# Patient Record
Sex: MALE | Race: WHITE | NOT HISPANIC OR LATINO | Employment: FULL TIME | URBAN - METROPOLITAN AREA
[De-identification: names, ages, dates, MRNs, and addresses within clinical notes are randomized per-mention and may not be internally consistent; named-entity substitution may affect disease eponyms.]

---

## 2018-07-27 ENCOUNTER — HOSPITAL ENCOUNTER (OUTPATIENT)
Dept: PAIN MEDICINE | Facility: HOSPITAL | Age: 53
Discharge: HOME | End: 2018-07-27
Attending: ANESTHESIOLOGY | Admitting: ANESTHESIOLOGY

## 2018-07-27 LAB
AMPHETAMINES UR QL SCN: NEGATIVE
BARBITURATES UR QL SCN: NEGATIVE
BENZODIAZ UR QL SCN: ABNORMAL
BZE UR QL SCN: NEGATIVE
CREAT 24H UR-MCNC: ABNORMAL MG/DL
METHADONE UR QL SCN: NEGATIVE
OPIATE CONFIRMATION URINE: ABNORMAL
OPIATES TESTED UR SCN: NEGATIVE
PCP UR QL: NEGATIVE
THC SERPLBLD CFM-MCNC: NEGATIVE NG/ML

## 2018-09-07 ENCOUNTER — HOSPITAL ENCOUNTER (OUTPATIENT)
Dept: PAIN MEDICINE | Facility: HOSPITAL | Age: 53
Discharge: HOME | End: 2018-09-07
Attending: ANESTHESIOLOGY | Admitting: ANESTHESIOLOGY

## 2018-11-16 ENCOUNTER — HOSPITAL ENCOUNTER (OUTPATIENT)
Dept: PAIN MEDICINE | Facility: HOSPITAL | Age: 53
Discharge: HOME | End: 2018-11-16
Attending: ANESTHESIOLOGY | Admitting: ANESTHESIOLOGY

## 2019-01-11 ENCOUNTER — HOSPITAL ENCOUNTER (OUTPATIENT)
Dept: PAIN MEDICINE | Facility: HOSPITAL | Age: 54
Discharge: HOME | End: 2019-01-11
Attending: ANESTHESIOLOGY | Admitting: ANESTHESIOLOGY

## 2019-01-11 LAB
AMPHETAMINES UR QL SCN: NEGATIVE
BARBITURATES UR QL SCN: NEGATIVE
BENZODIAZ UR QL SCN: ABNORMAL
BENZODIAZ UR QL SCN: ABNORMAL
BZE UR QL SCN: NEGATIVE
CREAT 24H UR-MCNC: ABNORMAL MG/DL
METHADONE UR QL SCN: NEGATIVE
OPIATE CONFIRMATION URINE: ABNORMAL
OPIATES TESTED UR SCN: POSITIVE
PCP UR QL: NEGATIVE
THC SERPLBLD CFM-MCNC: NEGATIVE NG/ML

## 2019-03-08 ENCOUNTER — HOSPITAL ENCOUNTER (OUTPATIENT)
Dept: PAIN MEDICINE | Facility: HOSPITAL | Age: 54
Discharge: HOME | End: 2019-03-08
Attending: ANESTHESIOLOGY | Admitting: ANESTHESIOLOGY

## 2019-09-09 ENCOUNTER — OFFICE VISIT (OUTPATIENT)
Dept: PAIN MEDICINE | Facility: CLINIC | Age: 54
End: 2019-09-09

## 2019-09-09 VITALS
HEIGHT: 70 IN | SYSTOLIC BLOOD PRESSURE: 133 MMHG | OXYGEN SATURATION: 96 % | DIASTOLIC BLOOD PRESSURE: 90 MMHG | BODY MASS INDEX: 31.5 KG/M2 | TEMPERATURE: 98.5 F | RESPIRATION RATE: 16 BRPM | WEIGHT: 220 LBS | HEART RATE: 88 BPM

## 2019-09-09 DIAGNOSIS — G89.29 CHRONIC MIDLINE LOW BACK PAIN WITH LEFT-SIDED SCIATICA: ICD-10-CM

## 2019-09-09 DIAGNOSIS — M54.2 NECK PAIN, CHRONIC: ICD-10-CM

## 2019-09-09 DIAGNOSIS — M54.42 CHRONIC MIDLINE LOW BACK PAIN WITH LEFT-SIDED SCIATICA: ICD-10-CM

## 2019-09-09 DIAGNOSIS — Z79.899 OTHER LONG TERM (CURRENT) DRUG THERAPY: ICD-10-CM

## 2019-09-09 DIAGNOSIS — M54.16 LUMBAR RADICULOPATHY: Primary | ICD-10-CM

## 2019-09-09 DIAGNOSIS — G89.29 NECK PAIN, CHRONIC: ICD-10-CM

## 2019-09-09 DIAGNOSIS — M54.12 CERVICAL RADICULOPATHY: ICD-10-CM

## 2019-09-09 PROCEDURE — 99213 OFFICE O/P EST LOW 20 MIN: CPT | Performed by: PHYSICAL MEDICINE & REHABILITATION

## 2019-09-09 PROCEDURE — G0463 HOSPITAL OUTPT CLINIC VISIT: HCPCS | Performed by: PHYSICAL MEDICINE & REHABILITATION

## 2019-09-09 RX ORDER — HYDROCODONE BITARTRATE AND ACETAMINOPHEN 10; 325 MG/1; MG/1
1 TABLET ORAL EVERY 8 HOURS PRN
Qty: 90 TABLET | Refills: 0 | Status: SHIPPED | OUTPATIENT
Start: 2019-09-09 | End: 2019-12-06 | Stop reason: SDUPTHER

## 2019-09-09 RX ORDER — HYDROCODONE BITARTRATE AND ACETAMINOPHEN 10; 325 MG/1; MG/1
1 TABLET ORAL EVERY 8 HOURS PRN
Qty: 90 TABLET | Refills: 0 | Status: SHIPPED | OUTPATIENT
Start: 2019-09-09 | End: 2019-09-09 | Stop reason: SDUPTHER

## 2019-09-09 RX ORDER — FENOFIBRATE 145 MG/1
TABLET, COATED ORAL
COMMUNITY
Start: 2019-08-16

## 2019-09-09 RX ORDER — HYDRALAZINE HYDROCHLORIDE 50 MG/1
TABLET, FILM COATED ORAL
COMMUNITY
Start: 2019-08-16 | End: 2020-08-28 | Stop reason: SDUPTHER

## 2019-09-09 RX ORDER — CYCLOBENZAPRINE HCL 10 MG
TABLET ORAL
COMMUNITY
Start: 2019-08-16

## 2019-09-09 RX ORDER — SIMVASTATIN 40 MG
TABLET ORAL
COMMUNITY
Start: 2019-08-16

## 2019-09-09 RX ORDER — TERBINAFINE HYDROCHLORIDE 250 MG/1
TABLET ORAL EVERY 24 HOURS
COMMUNITY
Start: 2018-07-27

## 2019-09-09 RX ORDER — BUTALBITAL, ACETAMINOPHEN AND CAFFEINE 50; 325; 40 MG/1; MG/1; MG/1
TABLET ORAL
COMMUNITY
Start: 2019-07-05

## 2019-09-09 RX ORDER — TEMAZEPAM 30 MG/1
CAPSULE ORAL
COMMUNITY
Start: 2019-07-17 | End: 2020-06-10

## 2019-09-09 RX ORDER — DOXAZOSIN 2 MG/1
TABLET ORAL EVERY 24 HOURS
COMMUNITY
Start: 2018-07-27 | End: 2020-08-28 | Stop reason: SDUPTHER

## 2019-09-09 NOTE — PROGRESS NOTES
Subjective   Jose Balderas is a 53 y.o. male.     Low back pain,  Lumbar Back Pain with LLE Numbness and LLE weakness.  Lumbar Back Pain is increased with prolonged ambulation.  Patient declines Epidural therapy.  Patient agreds to try ZT Lido pain patches for Pain, denied by insurance. Taking Norco 10mg TID prn with Dr. Ferguson with good relief without side effects. CT Lumbar with L5-S1 ant/post fusion, severe DJD at L4-L5, mild-mod L4-5 stenosis. MRI cervical with multilevel DDD and DJD worst at C6/7 with mild stenosis.         The following portions of the patient's history were reviewed and updated as appropriate: allergies, current medications, past family history, past medical history, past social history, past surgical history and problem list.    Review of Systems   Constitutional: Negative for chills, fatigue and fever.   HENT: Negative for hearing loss and trouble swallowing.    Eyes: Negative for visual disturbance.   Respiratory: Negative for shortness of breath.    Cardiovascular: Negative for chest pain.   Gastrointestinal: Negative for abdominal pain, constipation, diarrhea, nausea and vomiting.   Genitourinary: Negative for urinary incontinence.   Musculoskeletal: Positive for back pain and neck pain. Negative for arthralgias, joint swelling and myalgias.   Neurological: Positive for numbness. Negative for dizziness, weakness and headache.       Objective   Physical Exam   Constitutional: He is oriented to person, place, and time. He appears well-developed and well-nourished.   HENT:   Head: Normocephalic and atraumatic.   Eyes: EOM are normal. Pupils are equal, round, and reactive to light.   Cardiovascular: Normal rate, regular rhythm, normal heart sounds and intact distal pulses.   Pulmonary/Chest: Breath sounds normal.   Abdominal: Soft. Bowel sounds are normal. He exhibits no distension. There is no tenderness.   Neurological: He is alert and oriented to person, place, and time. He has normal  strength and normal reflexes. He displays normal reflexes. No sensory deficit.   Psychiatric: He has a normal mood and affect. His behavior is normal. Thought content normal.         Assessment/Plan   Jose was seen today for back pain, leg pain, foot pain and neck pain.    Diagnoses and all orders for this visit:    Lumbar radiculopathy    Neck pain, chronic    Cervical radiculopathy    Other long term (current) drug therapy    Chronic midline low back pain with left-sided sciatica    Other orders  -     Discontinue: HYDROcodone-acetaminophen (NORCO)  MG per tablet; Take 1 tablet by mouth Every 8 (Eight) Hours As Needed for Moderate Pain .  -     Discontinue: HYDROcodone-acetaminophen (NORCO)  MG per tablet; Take 1 tablet by mouth Every 8 (Eight) Hours As Needed for Moderate Pain .  -     HYDROcodone-acetaminophen (NORCO)  MG per tablet; Take 1 tablet by mouth Every 8 (Eight) Hours As Needed for Moderate Pain .        Inspect reviewed, in order. UDS in order 1/11/19.  Cont Norco 10mg TID prn.  RTC 3 months for f/u. Stable.

## 2019-12-06 ENCOUNTER — OFFICE VISIT (OUTPATIENT)
Dept: PAIN MEDICINE | Facility: CLINIC | Age: 54
End: 2019-12-06

## 2019-12-06 VITALS
OXYGEN SATURATION: 94 % | SYSTOLIC BLOOD PRESSURE: 131 MMHG | TEMPERATURE: 98.9 F | DIASTOLIC BLOOD PRESSURE: 79 MMHG | HEART RATE: 83 BPM | WEIGHT: 225 LBS | BODY MASS INDEX: 32.21 KG/M2 | HEIGHT: 70 IN | RESPIRATION RATE: 16 BRPM

## 2019-12-06 DIAGNOSIS — G89.29 CHRONIC MIDLINE LOW BACK PAIN WITH LEFT-SIDED SCIATICA: ICD-10-CM

## 2019-12-06 DIAGNOSIS — G89.29 NECK PAIN, CHRONIC: ICD-10-CM

## 2019-12-06 DIAGNOSIS — Z79.899 OTHER LONG TERM (CURRENT) DRUG THERAPY: ICD-10-CM

## 2019-12-06 DIAGNOSIS — M54.42 CHRONIC MIDLINE LOW BACK PAIN WITH LEFT-SIDED SCIATICA: ICD-10-CM

## 2019-12-06 DIAGNOSIS — M54.2 NECK PAIN, CHRONIC: ICD-10-CM

## 2019-12-06 DIAGNOSIS — M54.12 CERVICAL RADICULOPATHY: Primary | ICD-10-CM

## 2019-12-06 DIAGNOSIS — M54.16 LUMBAR RADICULOPATHY: ICD-10-CM

## 2019-12-06 PROCEDURE — G0463 HOSPITAL OUTPT CLINIC VISIT: HCPCS | Performed by: PHYSICAL MEDICINE & REHABILITATION

## 2019-12-06 PROCEDURE — 99213 OFFICE O/P EST LOW 20 MIN: CPT | Performed by: PHYSICAL MEDICINE & REHABILITATION

## 2019-12-06 RX ORDER — HYDROCODONE BITARTRATE AND ACETAMINOPHEN 10; 325 MG/1; MG/1
1 TABLET ORAL EVERY 8 HOURS PRN
Qty: 90 TABLET | Refills: 0 | Status: SHIPPED | OUTPATIENT
Start: 2019-12-06 | End: 2020-02-27 | Stop reason: SDUPTHER

## 2019-12-06 RX ORDER — HYDROCODONE BITARTRATE AND ACETAMINOPHEN 10; 325 MG/1; MG/1
1 TABLET ORAL EVERY 8 HOURS PRN
Qty: 90 TABLET | Refills: 0 | Status: SHIPPED | OUTPATIENT
Start: 2019-12-06 | End: 2019-12-06 | Stop reason: SDUPTHER

## 2019-12-06 NOTE — PROGRESS NOTES
Subjective   Jose Balderas is a 54 y.o. male.     Low back pain,  Lumbar Back Pain with LLE Numbness and LLE weakness.  Lumbar Back Pain is increased with prolonged ambulation.  Patient declines Epidural therapy.  Patient agreds to try ZT Lido pain patches for Pain, denied by insurance. Taking Norco 10mg TID prn with Dr. Ferguson with good relief without side effects. CT Lumbar with L5-S1 ant/post fusion, severe DJD at L4-L5, mild-mod L4-5 stenosis. MRI cervical with multilevel DDD and DJD worst at C6/7 with mild stenosis. Had fungal sinus infection, resolved with antifungals.         The following portions of the patient's history were reviewed and updated as appropriate: allergies, current medications, past family history, past medical history, past social history, past surgical history and problem list.    Review of Systems   Constitutional: Negative for chills, fatigue and fever.   HENT: Negative for hearing loss and trouble swallowing.    Eyes: Negative for visual disturbance.   Respiratory: Negative for shortness of breath.    Cardiovascular: Negative for chest pain.   Gastrointestinal: Negative for abdominal pain, constipation, diarrhea, nausea and vomiting.   Genitourinary: Negative for urinary incontinence.   Musculoskeletal: Positive for back pain and neck pain. Negative for arthralgias, joint swelling and myalgias.   Neurological: Positive for numbness. Negative for dizziness, weakness and headache.       Objective   Physical Exam   Constitutional: He is oriented to person, place, and time. He appears well-developed and well-nourished.   HENT:   Head: Normocephalic and atraumatic.   Eyes: EOM are normal. Pupils are equal, round, and reactive to light.   Cardiovascular: Normal rate, regular rhythm, normal heart sounds and intact distal pulses.   Pulmonary/Chest: Breath sounds normal.   Abdominal: Soft. Bowel sounds are normal. He exhibits no distension. There is no tenderness.   Neurological: He is alert and  oriented to person, place, and time. He has normal strength and normal reflexes. He displays normal reflexes. No sensory deficit.   Psychiatric: He has a normal mood and affect. His behavior is normal. Thought content normal.         Assessment/Plan   Jose was seen today for back pain.    Diagnoses and all orders for this visit:    Cervical radiculopathy    Chronic midline low back pain with left-sided sciatica    Lumbar radiculopathy    Neck pain, chronic    Other long term (current) drug therapy        Inspect reviewed, in order. UDS in order 1/11/19.  Cont Norco 10mg TID prn.  RTC 3 months for f/u. Stable.

## 2020-02-27 ENCOUNTER — RESULTS ENCOUNTER (OUTPATIENT)
Dept: PAIN MEDICINE | Facility: CLINIC | Age: 55
End: 2020-02-27

## 2020-02-27 ENCOUNTER — OFFICE VISIT (OUTPATIENT)
Dept: PAIN MEDICINE | Facility: CLINIC | Age: 55
End: 2020-02-27

## 2020-02-27 VITALS
BODY MASS INDEX: 32.21 KG/M2 | OXYGEN SATURATION: 93 % | RESPIRATION RATE: 16 BRPM | HEIGHT: 70 IN | DIASTOLIC BLOOD PRESSURE: 79 MMHG | SYSTOLIC BLOOD PRESSURE: 126 MMHG | HEART RATE: 85 BPM | TEMPERATURE: 98 F | WEIGHT: 225 LBS

## 2020-02-27 DIAGNOSIS — M54.2 NECK PAIN, CHRONIC: ICD-10-CM

## 2020-02-27 DIAGNOSIS — F19.90 CURRENT DRUG USE: Primary | ICD-10-CM

## 2020-02-27 DIAGNOSIS — G89.29 NECK PAIN, CHRONIC: ICD-10-CM

## 2020-02-27 DIAGNOSIS — M54.12 CERVICAL RADICULOPATHY: ICD-10-CM

## 2020-02-27 DIAGNOSIS — M54.42 CHRONIC MIDLINE LOW BACK PAIN WITH LEFT-SIDED SCIATICA: ICD-10-CM

## 2020-02-27 DIAGNOSIS — M54.16 LUMBAR RADICULOPATHY: ICD-10-CM

## 2020-02-27 DIAGNOSIS — G89.29 CHRONIC MIDLINE LOW BACK PAIN WITH LEFT-SIDED SCIATICA: ICD-10-CM

## 2020-02-27 DIAGNOSIS — F19.90 CURRENT DRUG USE: ICD-10-CM

## 2020-02-27 DIAGNOSIS — Z79.899 OTHER LONG TERM (CURRENT) DRUG THERAPY: ICD-10-CM

## 2020-02-27 PROCEDURE — 99213 OFFICE O/P EST LOW 20 MIN: CPT | Performed by: PHYSICAL MEDICINE & REHABILITATION

## 2020-02-27 PROCEDURE — G0463 HOSPITAL OUTPT CLINIC VISIT: HCPCS | Performed by: PHYSICAL MEDICINE & REHABILITATION

## 2020-02-27 RX ORDER — HYDROCODONE BITARTRATE AND ACETAMINOPHEN 10; 325 MG/1; MG/1
1 TABLET ORAL EVERY 8 HOURS PRN
Qty: 90 TABLET | Refills: 0 | Status: SHIPPED | OUTPATIENT
Start: 2020-02-27 | End: 2020-02-27 | Stop reason: SDUPTHER

## 2020-02-27 RX ORDER — ICOSAPENT ETHYL 1000 MG/1
CAPSULE ORAL
COMMUNITY
Start: 2020-02-06

## 2020-02-27 RX ORDER — ATORVASTATIN CALCIUM 40 MG/1
TABLET, FILM COATED ORAL
COMMUNITY
Start: 2020-02-06

## 2020-02-27 RX ORDER — HYDROCODONE BITARTRATE AND ACETAMINOPHEN 10; 325 MG/1; MG/1
1 TABLET ORAL EVERY 8 HOURS PRN
Qty: 90 TABLET | Refills: 0 | Status: SHIPPED | OUTPATIENT
Start: 2020-02-27 | End: 2020-06-05 | Stop reason: SDUPTHER

## 2020-02-27 RX ORDER — VARENICLINE TARTRATE 1 MG/1
TABLET, FILM COATED ORAL
COMMUNITY
Start: 2020-02-06

## 2020-02-27 RX ORDER — FOLIC ACID 1 MG/1
TABLET ORAL
COMMUNITY
Start: 2020-02-06

## 2020-02-27 NOTE — PROGRESS NOTES
Subjective   Jose Balderas is a 54 y.o. male.     Low back pain,  Lumbar Back Pain with LLE Numbness and LLE weakness.  Lumbar Back Pain is increased with prolonged ambulation.  Patient declines Epidural therapy.  Patient agreds to try ZT Lido pain patches for Pain, denied by insurance. Taking Norco 10mg TID prn with Dr. Ferguson with good relief without side effects. CT Lumbar with L5-S1 ant/post fusion, severe DJD at L4-L5, mild-mod L4-5 stenosis. MRI cervical with multilevel DDD and DJD worst at C6/7 with mild stenosis. Had fungal sinus infection, resolved with antifungals.       The following portions of the patient's history were reviewed and updated as appropriate: allergies, current medications, past family history, past medical history, past social history, past surgical history and problem list.    Review of Systems   Constitutional: Negative for chills, fatigue and fever.   HENT: Negative for hearing loss and trouble swallowing.    Eyes: Negative for visual disturbance.   Respiratory: Negative for shortness of breath.    Cardiovascular: Negative for chest pain.   Gastrointestinal: Negative for abdominal pain, constipation, diarrhea, nausea and vomiting.   Genitourinary: Negative for urinary incontinence.   Musculoskeletal: Positive for back pain and neck pain. Negative for arthralgias, joint swelling and myalgias.   Neurological: Positive for numbness. Negative for dizziness, weakness and headache.       Objective   Physical Exam   Constitutional: He is oriented to person, place, and time. He appears well-developed and well-nourished.   HENT:   Head: Normocephalic and atraumatic.   Eyes: Pupils are equal, round, and reactive to light. EOM are normal.   Cardiovascular: Normal rate, regular rhythm, normal heart sounds and intact distal pulses.   Pulmonary/Chest: Breath sounds normal.   Abdominal: Soft. Bowel sounds are normal. He exhibits no distension. There is no tenderness.   Neurological: He is alert and  oriented to person, place, and time. He has normal strength and normal reflexes. He displays normal reflexes. No sensory deficit.   Psychiatric: He has a normal mood and affect. His behavior is normal. Thought content normal.         Assessment/Plan   Jose was seen today for back pain.    Diagnoses and all orders for this visit:    Cervical radiculopathy    Chronic midline low back pain with left-sided sciatica    Lumbar radiculopathy    Neck pain, chronic    Other long term (current) drug therapy        Inspect reviewed, in order. UDS in order 1/11/19.  Cont Norco 10mg TID prn.  RTC 3 months for f/u. Stable.

## 2020-05-08 RX ORDER — TEMAZEPAM 30 MG/1
CAPSULE ORAL
Qty: 30 CAPSULE | Refills: 0 | OUTPATIENT
Start: 2020-05-08

## 2020-05-11 RX ORDER — TEMAZEPAM 30 MG/1
CAPSULE ORAL
Qty: 30 CAPSULE | Refills: 0 | OUTPATIENT
Start: 2020-05-11

## 2020-06-05 ENCOUNTER — OFFICE VISIT (OUTPATIENT)
Dept: PAIN MEDICINE | Facility: CLINIC | Age: 55
End: 2020-06-05

## 2020-06-05 VITALS
BODY MASS INDEX: 30.78 KG/M2 | OXYGEN SATURATION: 96 % | RESPIRATION RATE: 16 BRPM | HEART RATE: 89 BPM | HEIGHT: 70 IN | WEIGHT: 215 LBS | TEMPERATURE: 99.5 F | DIASTOLIC BLOOD PRESSURE: 88 MMHG | SYSTOLIC BLOOD PRESSURE: 144 MMHG

## 2020-06-05 DIAGNOSIS — M54.42 CHRONIC MIDLINE LOW BACK PAIN WITH LEFT-SIDED SCIATICA: ICD-10-CM

## 2020-06-05 DIAGNOSIS — M54.12 CERVICAL RADICULOPATHY: Primary | ICD-10-CM

## 2020-06-05 DIAGNOSIS — G89.29 CHRONIC MIDLINE LOW BACK PAIN WITH LEFT-SIDED SCIATICA: ICD-10-CM

## 2020-06-05 DIAGNOSIS — Z79.899 OTHER LONG TERM (CURRENT) DRUG THERAPY: ICD-10-CM

## 2020-06-05 DIAGNOSIS — M54.16 LUMBAR RADICULOPATHY: ICD-10-CM

## 2020-06-05 DIAGNOSIS — M54.2 NECK PAIN, CHRONIC: ICD-10-CM

## 2020-06-05 DIAGNOSIS — G89.29 NECK PAIN, CHRONIC: ICD-10-CM

## 2020-06-05 PROCEDURE — G0463 HOSPITAL OUTPT CLINIC VISIT: HCPCS | Performed by: PHYSICAL MEDICINE & REHABILITATION

## 2020-06-05 PROCEDURE — 99213 OFFICE O/P EST LOW 20 MIN: CPT | Performed by: PHYSICAL MEDICINE & REHABILITATION

## 2020-06-05 RX ORDER — HYDROCODONE BITARTRATE AND ACETAMINOPHEN 10; 325 MG/1; MG/1
1 TABLET ORAL EVERY 8 HOURS PRN
Qty: 90 TABLET | Refills: 0 | Status: SHIPPED | OUTPATIENT
Start: 2020-06-05 | End: 2020-08-28 | Stop reason: SDUPTHER

## 2020-06-05 RX ORDER — HYDROCODONE BITARTRATE AND ACETAMINOPHEN 10; 325 MG/1; MG/1
1 TABLET ORAL EVERY 8 HOURS PRN
Qty: 90 TABLET | Refills: 0 | Status: SHIPPED | OUTPATIENT
Start: 2020-06-05 | End: 2020-06-05 | Stop reason: SDUPTHER

## 2020-06-10 RX ORDER — TEMAZEPAM 30 MG/1
CAPSULE ORAL
Qty: 30 CAPSULE | Refills: 0 | Status: SHIPPED | OUTPATIENT
Start: 2020-06-10 | End: 2020-07-10

## 2020-08-28 ENCOUNTER — OFFICE VISIT (OUTPATIENT)
Dept: PAIN MEDICINE | Facility: CLINIC | Age: 55
End: 2020-08-28

## 2020-08-28 VITALS
HEIGHT: 70 IN | OXYGEN SATURATION: 95 % | SYSTOLIC BLOOD PRESSURE: 151 MMHG | RESPIRATION RATE: 16 BRPM | BODY MASS INDEX: 31.5 KG/M2 | WEIGHT: 220 LBS | TEMPERATURE: 98.5 F | HEART RATE: 83 BPM | DIASTOLIC BLOOD PRESSURE: 99 MMHG

## 2020-08-28 DIAGNOSIS — Z79.899 OTHER LONG TERM (CURRENT) DRUG THERAPY: ICD-10-CM

## 2020-08-28 DIAGNOSIS — M54.42 CHRONIC MIDLINE LOW BACK PAIN WITH LEFT-SIDED SCIATICA: Primary | ICD-10-CM

## 2020-08-28 DIAGNOSIS — M54.12 CERVICAL RADICULOPATHY: ICD-10-CM

## 2020-08-28 DIAGNOSIS — G89.29 NECK PAIN, CHRONIC: ICD-10-CM

## 2020-08-28 DIAGNOSIS — G89.29 CHRONIC MIDLINE LOW BACK PAIN WITH LEFT-SIDED SCIATICA: Primary | ICD-10-CM

## 2020-08-28 DIAGNOSIS — M54.2 NECK PAIN, CHRONIC: ICD-10-CM

## 2020-08-28 DIAGNOSIS — M54.16 LUMBAR RADICULOPATHY: ICD-10-CM

## 2020-08-28 PROCEDURE — G0463 HOSPITAL OUTPT CLINIC VISIT: HCPCS | Performed by: PHYSICAL MEDICINE & REHABILITATION

## 2020-08-28 PROCEDURE — 99213 OFFICE O/P EST LOW 20 MIN: CPT | Performed by: PHYSICAL MEDICINE & REHABILITATION

## 2020-08-28 RX ORDER — HYDROCODONE BITARTRATE AND ACETAMINOPHEN 10; 325 MG/1; MG/1
1 TABLET ORAL EVERY 8 HOURS PRN
Qty: 90 TABLET | Refills: 0 | Status: SHIPPED | OUTPATIENT
Start: 2020-08-28 | End: 2020-08-28 | Stop reason: SDUPTHER

## 2020-08-28 RX ORDER — TEMAZEPAM 30 MG/1
CAPSULE ORAL
COMMUNITY
Start: 2020-08-07

## 2020-08-28 RX ORDER — DOXAZOSIN MESYLATE 4 MG/1
4 TABLET ORAL NIGHTLY
COMMUNITY

## 2020-08-28 RX ORDER — HYDRALAZINE HYDROCHLORIDE 100 MG/1
TABLET, FILM COATED ORAL
COMMUNITY
Start: 2020-08-07 | End: 2020-08-28 | Stop reason: SDUPTHER

## 2020-08-28 RX ORDER — MONTELUKAST SODIUM 10 MG/1
TABLET ORAL
COMMUNITY
Start: 2020-08-07

## 2020-08-28 RX ORDER — DOXAZOSIN MESYLATE 4 MG/1
TABLET ORAL
COMMUNITY
Start: 2020-08-07 | End: 2020-08-28 | Stop reason: SDUPTHER

## 2020-08-28 RX ORDER — HYDROCODONE BITARTRATE AND ACETAMINOPHEN 10; 325 MG/1; MG/1
1 TABLET ORAL EVERY 8 HOURS PRN
Qty: 90 TABLET | Refills: 0 | Status: SHIPPED | OUTPATIENT
Start: 2020-08-28 | End: 2020-12-04 | Stop reason: SDUPTHER

## 2020-08-28 NOTE — PROGRESS NOTES
Subjective   Jose Balderas is a 54 y.o. male.     Low back pain,  Lumbar Back Pain with LLE Numbness and LLE weakness.  Lumbar Back Pain is increased with prolonged ambulation.  Patient declines Epidural therapy.  Patient agreds to try ZT Lido pain patches for Pain, denied by insurance. Taking Norco 10mg TID prn with Dr. Ferguson with good relief without side effects. CT Lumbar with L5-S1 ant/post fusion, severe DJD at L4-L5, mild-mod L4-5 stenosis. MRI cervical with multilevel DDD and DJD worst at C6/7 with mild stenosis. Had fungal sinus infection, resolved with antifungals. Busy at work, current meds adequate.       The following portions of the patient's history were reviewed and updated as appropriate: allergies, current medications, past family history, past medical history, past social history, past surgical history and problem list.    Review of Systems   Constitutional: Negative for chills, fatigue and fever.   HENT: Negative for hearing loss and trouble swallowing.    Eyes: Negative for visual disturbance.   Respiratory: Negative for shortness of breath.    Cardiovascular: Negative for chest pain.   Gastrointestinal: Negative for abdominal pain, constipation, diarrhea, nausea and vomiting.   Genitourinary: Negative for urinary incontinence.   Musculoskeletal: Positive for back pain and neck pain. Negative for arthralgias, joint swelling and myalgias.   Neurological: Positive for numbness. Negative for dizziness, weakness and headache.       Objective   Physical Exam   Constitutional: He is oriented to person, place, and time. He appears well-developed and well-nourished.   HENT:   Head: Normocephalic and atraumatic.   Eyes: Pupils are equal, round, and reactive to light. EOM are normal.   Cardiovascular: Normal rate, regular rhythm, normal heart sounds and intact distal pulses.   Pulmonary/Chest: Breath sounds normal.   Abdominal: Soft. Bowel sounds are normal. He exhibits no distension. There is no  tenderness.   Neurological: He is alert and oriented to person, place, and time. He has normal strength and normal reflexes. He displays normal reflexes. No sensory deficit.   Psychiatric: He has a normal mood and affect. His behavior is normal. Thought content normal.         Assessment/Plan   Jose was seen today for back pain.    Diagnoses and all orders for this visit:    Chronic midline low back pain with left-sided sciatica    Lumbar radiculopathy    Neck pain, chronic    Cervical radiculopathy    Other long term (current) drug therapy        Inspect reviewed, in order. UDS in order 2/27/20.  Cont Norco 10mg TID prn. Denies side effects, allows him to be functional.  RTC 3 months for f/u. Stable.

## 2020-10-30 ENCOUNTER — ON CAMPUS - OUTPATIENT (OUTPATIENT)
Dept: URBAN - METROPOLITAN AREA HOSPITAL 77 | Facility: HOSPITAL | Age: 55
End: 2020-10-30
Payer: COMMERCIAL

## 2020-10-30 DIAGNOSIS — R11.2 NAUSEA WITH VOMITING, UNSPECIFIED: ICD-10-CM

## 2020-10-30 DIAGNOSIS — K20.80 OTHER ESOPHAGITIS WITHOUT BLEEDING: ICD-10-CM

## 2020-10-30 DIAGNOSIS — Z12.11 ENCOUNTER FOR SCREENING FOR MALIGNANT NEOPLASM OF COLON: ICD-10-CM

## 2020-10-30 PROCEDURE — 45378 DIAGNOSTIC COLONOSCOPY: CPT | Mod: 33 | Performed by: INTERNAL MEDICINE

## 2020-10-30 PROCEDURE — 43235 EGD DIAGNOSTIC BRUSH WASH: CPT | Mod: 59 | Performed by: INTERNAL MEDICINE

## 2020-12-04 ENCOUNTER — OFFICE VISIT (OUTPATIENT)
Dept: PAIN MEDICINE | Facility: CLINIC | Age: 55
End: 2020-12-04

## 2020-12-04 VITALS
SYSTOLIC BLOOD PRESSURE: 102 MMHG | RESPIRATION RATE: 16 BRPM | HEART RATE: 83 BPM | BODY MASS INDEX: 31.5 KG/M2 | OXYGEN SATURATION: 96 % | TEMPERATURE: 97.5 F | HEIGHT: 70 IN | WEIGHT: 220 LBS | DIASTOLIC BLOOD PRESSURE: 68 MMHG

## 2020-12-04 DIAGNOSIS — Z79.899 OTHER LONG TERM (CURRENT) DRUG THERAPY: ICD-10-CM

## 2020-12-04 DIAGNOSIS — M54.16 LUMBAR RADICULOPATHY: ICD-10-CM

## 2020-12-04 DIAGNOSIS — G89.29 CHRONIC MIDLINE LOW BACK PAIN WITH LEFT-SIDED SCIATICA: Primary | ICD-10-CM

## 2020-12-04 DIAGNOSIS — M54.2 NECK PAIN, CHRONIC: ICD-10-CM

## 2020-12-04 DIAGNOSIS — G89.29 NECK PAIN, CHRONIC: ICD-10-CM

## 2020-12-04 DIAGNOSIS — M54.12 CERVICAL RADICULOPATHY: ICD-10-CM

## 2020-12-04 DIAGNOSIS — M54.42 CHRONIC MIDLINE LOW BACK PAIN WITH LEFT-SIDED SCIATICA: Primary | ICD-10-CM

## 2020-12-04 PROCEDURE — 99213 OFFICE O/P EST LOW 20 MIN: CPT | Performed by: PHYSICAL MEDICINE & REHABILITATION

## 2020-12-04 RX ORDER — HYDRALAZINE HYDROCHLORIDE 100 MG/1
TABLET, FILM COATED ORAL
COMMUNITY
Start: 2020-11-11

## 2020-12-04 RX ORDER — HYDROCODONE BITARTRATE AND ACETAMINOPHEN 10; 325 MG/1; MG/1
1 TABLET ORAL EVERY 8 HOURS PRN
Qty: 90 TABLET | Refills: 0 | Status: SHIPPED | OUTPATIENT
Start: 2020-12-04 | End: 2021-03-11 | Stop reason: SDUPTHER

## 2020-12-04 NOTE — PROGRESS NOTES
Subjective   Jose Balderas is a 55 y.o. male.     Low back pain,  Lumbar Back Pain with LLE Numbness and LLE weakness.  Lumbar Back Pain is increased with prolonged ambulation.  Patient declines Epidural therapy.  Patient agreds to try ZT Lido pain patches for Pain, denied by insurance. Taking Norco 10mg TID prn with Dr. Ferguson with good relief without side effects. CT Lumbar with L5-S1 ant/post fusion, severe DJD at L4-L5, mild-mod L4-5 stenosis. MRI cervical with multilevel DDD and DJD worst at C6/7 with mild stenosis. Had fungal sinus infection, resolved with antifungals. Busy at work, current meds adequate.       The following portions of the patient's history were reviewed and updated as appropriate: allergies, current medications, past family history, past medical history, past social history, past surgical history and problem list.    Review of Systems   Constitutional: Negative for chills, fatigue and fever.   HENT: Negative for hearing loss and trouble swallowing.    Eyes: Negative for visual disturbance.   Respiratory: Negative for shortness of breath.    Cardiovascular: Negative for chest pain.   Gastrointestinal: Negative for abdominal pain, constipation, diarrhea, nausea and vomiting.   Genitourinary: Negative for urinary incontinence.   Musculoskeletal: Positive for back pain and neck pain. Negative for arthralgias, joint swelling and myalgias.   Neurological: Positive for numbness. Negative for dizziness, weakness and headache.       Objective   Physical Exam   Constitutional: He is oriented to person, place, and time. He appears well-developed and well-nourished.   HENT:   Head: Normocephalic and atraumatic.   Eyes: Pupils are equal, round, and reactive to light.   Cardiovascular: Normal rate, regular rhythm and normal heart sounds.   Pulmonary/Chest: Breath sounds normal.   Abdominal: Soft. Bowel sounds are normal. He exhibits no distension. There is no abdominal tenderness.   Neurological: He is  alert and oriented to person, place, and time. He has normal reflexes. He displays normal reflexes. No sensory deficit.   Psychiatric: His behavior is normal. Thought content normal.         Assessment/Plan   Diagnoses and all orders for this visit:    1. Chronic midline low back pain with left-sided sciatica (Primary)    2. Lumbar radiculopathy    3. Neck pain, chronic    4. Other long term (current) drug therapy    5. Cervical radiculopathy        Inspect reviewed, in order. UDS in order 2/27/20.  Cont Norco 10mg TID prn. Denies side effects, allows him to be functional.  RTC 3 months for f/u. Stable.

## 2021-03-11 ENCOUNTER — OFFICE VISIT (OUTPATIENT)
Dept: PAIN MEDICINE | Facility: CLINIC | Age: 56
End: 2021-03-11

## 2021-03-11 VITALS — RESPIRATION RATE: 16 BRPM | BODY MASS INDEX: 31.5 KG/M2 | HEIGHT: 70 IN | WEIGHT: 220 LBS

## 2021-03-11 DIAGNOSIS — Z79.899 OTHER LONG TERM (CURRENT) DRUG THERAPY: ICD-10-CM

## 2021-03-11 DIAGNOSIS — M54.12 CERVICAL RADICULOPATHY: ICD-10-CM

## 2021-03-11 DIAGNOSIS — M54.2 NECK PAIN, CHRONIC: ICD-10-CM

## 2021-03-11 DIAGNOSIS — G89.29 NECK PAIN, CHRONIC: ICD-10-CM

## 2021-03-11 DIAGNOSIS — G89.29 CHRONIC MIDLINE LOW BACK PAIN WITH LEFT-SIDED SCIATICA: Primary | ICD-10-CM

## 2021-03-11 DIAGNOSIS — M54.42 CHRONIC MIDLINE LOW BACK PAIN WITH LEFT-SIDED SCIATICA: Primary | ICD-10-CM

## 2021-03-11 DIAGNOSIS — M54.16 LUMBAR RADICULOPATHY: ICD-10-CM

## 2021-03-11 PROCEDURE — 99441 PR PHYS/QHP TELEPHONE EVALUATION 5-10 MIN: CPT | Performed by: PHYSICAL MEDICINE & REHABILITATION

## 2021-03-11 RX ORDER — OMEPRAZOLE 40 MG/1
CAPSULE, DELAYED RELEASE ORAL
COMMUNITY
Start: 2021-01-15

## 2021-03-11 RX ORDER — HYDROCODONE BITARTRATE AND ACETAMINOPHEN 10; 325 MG/1; MG/1
1 TABLET ORAL EVERY 8 HOURS PRN
Qty: 90 TABLET | Refills: 0 | Status: SHIPPED | OUTPATIENT
Start: 2021-03-11 | End: 2021-06-04 | Stop reason: SDUPTHER

## 2021-03-11 RX ORDER — ONDANSETRON 8 MG/1
TABLET, ORALLY DISINTEGRATING ORAL
COMMUNITY
Start: 2021-02-12

## 2021-03-11 NOTE — PROGRESS NOTES
Subjective   Jose Balderas is a 55 y.o. male.     Low back pain,  Lumbar Back Pain with LLE Numbness and LLE weakness.  Lumbar Back Pain is increased with prolonged ambulation.  Patient declines Epidural therapy.  Patient agreds to try ZT Lido pain patches for Pain, denied by insurance. Taking Norco 10mg TID prn with Dr. Ferguson with good relief without side effects. CT Lumbar with L5-S1 ant/post fusion, severe DJD at L4-L5, mild-mod L4-5 stenosis. MRI cervical with multilevel DDD and DJD worst at C6/7 with mild stenosis. Had fungal sinus infection, resolved with antifungals. Busy at work, current meds adequate.       The following portions of the patient's history were reviewed and updated as appropriate: allergies, current medications, past family history, past medical history, past social history, past surgical history and problem list.    Review of Systems   Constitutional: Negative for chills, fatigue and fever.   HENT: Negative for hearing loss and trouble swallowing.    Eyes: Negative for visual disturbance.   Respiratory: Negative for shortness of breath.    Cardiovascular: Negative for chest pain.   Gastrointestinal: Negative for abdominal pain, constipation, diarrhea, nausea and vomiting.   Genitourinary: Negative for urinary incontinence.   Musculoskeletal: Positive for back pain and neck pain. Negative for arthralgias, joint swelling and myalgias.   Neurological: Positive for numbness. Negative for dizziness, weakness and headache.       Objective   Physical Exam   Constitutional: He is oriented to person, place, and time. He appears well-developed and well-nourished.   Neurological: He is alert and oriented to person, place, and time. He has normal reflexes.   Psychiatric: His behavior is normal. Mood, judgment and thought content normal.         Assessment/Plan   Diagnoses and all orders for this visit:    1. Chronic midline low back pain with left-sided sciatica (Primary)    2. Neck pain, chronic    3.  Lumbar radiculopathy    4. Cervical radiculopathy    5. Other long term (current) drug therapy        Inspect reviewed, in order. UDS in order 2/27/20.  Cont Norco 10mg TID prn. Denies side effects, allows him to be functional.  RTC 3 months for f/u. Stable. Telephone visit, spent 5 minutes discussing his meds and plan of care.

## 2021-03-12 ENCOUNTER — APPOINTMENT (OUTPATIENT)
Dept: PAIN MEDICINE | Facility: CLINIC | Age: 56
End: 2021-03-12

## 2021-06-04 ENCOUNTER — OFFICE VISIT (OUTPATIENT)
Dept: PAIN MEDICINE | Facility: CLINIC | Age: 56
End: 2021-06-04

## 2021-06-04 VITALS
WEIGHT: 220 LBS | BODY MASS INDEX: 31.5 KG/M2 | HEIGHT: 70 IN | HEART RATE: 81 BPM | OXYGEN SATURATION: 96 % | DIASTOLIC BLOOD PRESSURE: 92 MMHG | RESPIRATION RATE: 16 BRPM | SYSTOLIC BLOOD PRESSURE: 156 MMHG

## 2021-06-04 DIAGNOSIS — M54.42 CHRONIC MIDLINE LOW BACK PAIN WITH LEFT-SIDED SCIATICA: Primary | ICD-10-CM

## 2021-06-04 DIAGNOSIS — G89.29 CHRONIC MIDLINE LOW BACK PAIN WITH LEFT-SIDED SCIATICA: Primary | ICD-10-CM

## 2021-06-04 DIAGNOSIS — G89.29 NECK PAIN, CHRONIC: ICD-10-CM

## 2021-06-04 DIAGNOSIS — M54.16 LUMBAR RADICULOPATHY: ICD-10-CM

## 2021-06-04 DIAGNOSIS — M54.2 NECK PAIN, CHRONIC: ICD-10-CM

## 2021-06-04 DIAGNOSIS — M54.12 CERVICAL RADICULOPATHY: ICD-10-CM

## 2021-06-04 DIAGNOSIS — Z79.899 OTHER LONG TERM (CURRENT) DRUG THERAPY: ICD-10-CM

## 2021-06-04 PROCEDURE — 99213 OFFICE O/P EST LOW 20 MIN: CPT | Performed by: PHYSICAL MEDICINE & REHABILITATION

## 2021-06-04 RX ORDER — HYDROCODONE BITARTRATE AND ACETAMINOPHEN 10; 325 MG/1; MG/1
1 TABLET ORAL EVERY 8 HOURS PRN
Qty: 90 TABLET | Refills: 0 | Status: SHIPPED | OUTPATIENT
Start: 2021-06-04 | End: 2021-09-10 | Stop reason: SDUPTHER

## 2021-06-04 NOTE — PROGRESS NOTES
Subjective   Jose Balderas is a 55 y.o. male.     Low back pain,  Lumbar Back Pain with LLE Numbness and LLE weakness.  Lumbar Back Pain is increased with prolonged ambulation.  Patient declines Epidural therapy.  Patient agreds to try ZT Lido pain patches for Pain, denied by insurance. Taking Norco 10mg TID prn with Dr. Ferguson with good relief without side effects. CT Lumbar with L5-S1 ant/post fusion, severe DJD at L4-L5, mild-mod L4-5 stenosis. MRI cervical with multilevel DDD and DJD worst at C6/7 with mild stenosis. Had fungal sinus infection, resolved with antifungals. Busy at work, current meds adequate.       The following portions of the patient's history were reviewed and updated as appropriate: allergies, current medications, past family history, past medical history, past social history, past surgical history and problem list.    Review of Systems   Constitutional: Negative for chills, fatigue and fever.   HENT: Negative for hearing loss and trouble swallowing.    Eyes: Negative for visual disturbance.   Respiratory: Negative for shortness of breath.    Cardiovascular: Negative for chest pain.   Gastrointestinal: Negative for abdominal pain, constipation, diarrhea, nausea and vomiting.   Genitourinary: Negative for urinary incontinence.   Musculoskeletal: Positive for back pain and neck pain. Negative for arthralgias, joint swelling and myalgias.   Neurological: Positive for numbness. Negative for dizziness, weakness and headache.       Objective   Physical Exam   Constitutional: He is oriented to person, place, and time. He appears well-developed and well-nourished.   HENT:   Head: Normocephalic and atraumatic.   Eyes: Pupils are equal, round, and reactive to light.   Cardiovascular: Normal rate, regular rhythm and normal heart sounds.   Pulmonary/Chest: Breath sounds normal.   Abdominal: Soft. Bowel sounds are normal. He exhibits no distension. There is no abdominal tenderness.   Neurological: He is  alert and oriented to person, place, and time. He has normal reflexes. He displays normal reflexes. No sensory deficit.   Psychiatric: His behavior is normal. Thought content normal.         Assessment/Plan   Diagnoses and all orders for this visit:    1. Chronic midline low back pain with left-sided sciatica (Primary)    2. Neck pain, chronic    3. Cervical radiculopathy    4. Lumbar radiculopathy    5. Other long term (current) drug therapy        Inspect reviewed, in order. UDS in order 12/4/20.  Cont Norco 10mg TID prn. Denies side effects, allows him to be functional.  RTC 3 months for f/u. Stable.

## 2021-09-10 ENCOUNTER — OFFICE VISIT (OUTPATIENT)
Dept: PAIN MEDICINE | Facility: CLINIC | Age: 56
End: 2021-09-10

## 2021-09-10 VITALS
HEART RATE: 73 BPM | SYSTOLIC BLOOD PRESSURE: 144 MMHG | BODY MASS INDEX: 31.5 KG/M2 | DIASTOLIC BLOOD PRESSURE: 87 MMHG | HEIGHT: 70 IN | OXYGEN SATURATION: 96 % | WEIGHT: 220 LBS | RESPIRATION RATE: 16 BRPM

## 2021-09-10 DIAGNOSIS — Z79.899 OTHER LONG TERM (CURRENT) DRUG THERAPY: ICD-10-CM

## 2021-09-10 DIAGNOSIS — M54.16 LUMBAR RADICULOPATHY: ICD-10-CM

## 2021-09-10 DIAGNOSIS — M54.42 CHRONIC MIDLINE LOW BACK PAIN WITH LEFT-SIDED SCIATICA: Primary | ICD-10-CM

## 2021-09-10 DIAGNOSIS — M54.2 NECK PAIN, CHRONIC: ICD-10-CM

## 2021-09-10 DIAGNOSIS — M54.12 CERVICAL RADICULOPATHY: ICD-10-CM

## 2021-09-10 DIAGNOSIS — G89.29 CHRONIC MIDLINE LOW BACK PAIN WITH LEFT-SIDED SCIATICA: Primary | ICD-10-CM

## 2021-09-10 DIAGNOSIS — G89.29 NECK PAIN, CHRONIC: ICD-10-CM

## 2021-09-10 PROCEDURE — 99213 OFFICE O/P EST LOW 20 MIN: CPT | Performed by: PHYSICAL MEDICINE & REHABILITATION

## 2021-09-10 RX ORDER — HYDROCODONE BITARTRATE AND ACETAMINOPHEN 10; 325 MG/1; MG/1
TABLET ORAL
Qty: 90 TABLET | Refills: 0 | OUTPATIENT
Start: 2021-09-10

## 2021-09-10 RX ORDER — HYDROCODONE BITARTRATE AND ACETAMINOPHEN 10; 325 MG/1; MG/1
1 TABLET ORAL EVERY 8 HOURS PRN
Qty: 90 TABLET | Refills: 0 | Status: SHIPPED | OUTPATIENT
Start: 2021-09-10 | End: 2021-12-03 | Stop reason: SDUPTHER

## 2021-12-03 ENCOUNTER — OFFICE VISIT (OUTPATIENT)
Dept: PAIN MEDICINE | Facility: CLINIC | Age: 56
End: 2021-12-03

## 2021-12-03 VITALS
HEART RATE: 72 BPM | DIASTOLIC BLOOD PRESSURE: 93 MMHG | WEIGHT: 220 LBS | HEIGHT: 70 IN | OXYGEN SATURATION: 96 % | BODY MASS INDEX: 31.5 KG/M2 | RESPIRATION RATE: 16 BRPM | SYSTOLIC BLOOD PRESSURE: 147 MMHG

## 2021-12-03 DIAGNOSIS — M54.42 CHRONIC MIDLINE LOW BACK PAIN WITH LEFT-SIDED SCIATICA: Primary | ICD-10-CM

## 2021-12-03 DIAGNOSIS — Z79.899 OTHER LONG TERM (CURRENT) DRUG THERAPY: ICD-10-CM

## 2021-12-03 DIAGNOSIS — M54.2 NECK PAIN, CHRONIC: ICD-10-CM

## 2021-12-03 DIAGNOSIS — G89.29 CHRONIC MIDLINE LOW BACK PAIN WITH LEFT-SIDED SCIATICA: Primary | ICD-10-CM

## 2021-12-03 DIAGNOSIS — Z79.899 HIGH RISK MEDICATION USE: Primary | ICD-10-CM

## 2021-12-03 DIAGNOSIS — G89.29 NECK PAIN, CHRONIC: ICD-10-CM

## 2021-12-03 DIAGNOSIS — M54.16 LUMBAR RADICULOPATHY: ICD-10-CM

## 2021-12-03 DIAGNOSIS — M54.12 CERVICAL RADICULOPATHY: ICD-10-CM

## 2021-12-03 PROCEDURE — 99213 OFFICE O/P EST LOW 20 MIN: CPT | Performed by: PHYSICAL MEDICINE & REHABILITATION

## 2021-12-03 RX ORDER — HYDROCODONE BITARTRATE AND ACETAMINOPHEN 10; 325 MG/1; MG/1
1 TABLET ORAL EVERY 8 HOURS PRN
Qty: 90 TABLET | Refills: 0 | Status: SHIPPED | OUTPATIENT
Start: 2021-12-03 | End: 2022-03-11 | Stop reason: SDUPTHER

## 2021-12-03 NOTE — PROGRESS NOTES
Subjective   Jose Balderas is a 56 y.o. male.     Low back pain,  Lumbar Back Pain with LLE Numbness and LLE weakness.  Lumbar Back Pain is increased with prolonged ambulation.  Patient declines Epidural therapy.  Patient agreds to try ZT Lido pain patches for Pain, denied by insurance. Taking Norco 10mg TID prn with Dr. Ferguson with good relief without side effects. CT Lumbar with L5-S1 ant/post fusion, severe DJD at L4-L5, mild-mod L4-5 stenosis. MRI cervical with multilevel DDD and DJD worst at C6/7 with mild stenosis. Had fungal sinus infection, resolved with antifungals. Busy at work, current meds adequate.       The following portions of the patient's history were reviewed and updated as appropriate: allergies, current medications, past family history, past medical history, past social history, past surgical history and problem list.    Review of Systems   Constitutional: Negative for chills, fatigue and fever.   HENT: Negative for hearing loss and trouble swallowing.    Eyes: Negative for visual disturbance.   Respiratory: Negative for shortness of breath.    Cardiovascular: Negative for chest pain.   Gastrointestinal: Negative for abdominal pain, constipation, diarrhea, nausea and vomiting.   Genitourinary: Negative for urinary incontinence.   Musculoskeletal: Positive for back pain and neck pain. Negative for arthralgias, joint swelling and myalgias.   Neurological: Positive for numbness. Negative for dizziness, weakness and headache.       Objective   Physical Exam   Constitutional: He is oriented to person, place, and time. He appears well-developed and well-nourished.   HENT:   Head: Normocephalic and atraumatic.   Eyes: Pupils are equal, round, and reactive to light.   Cardiovascular: Normal rate, regular rhythm and normal heart sounds.   Pulmonary/Chest: Breath sounds normal.   Abdominal: Soft. Bowel sounds are normal. He exhibits no distension. There is no abdominal tenderness.   Neurological: He is  alert and oriented to person, place, and time. He has normal reflexes. He displays normal reflexes. No sensory deficit.   Psychiatric: His behavior is normal. Thought content normal.         Assessment/Plan   Diagnoses and all orders for this visit:    1. Chronic midline low back pain with left-sided sciatica (Primary)    2. Neck pain, chronic    3. Cervical radiculopathy    4. Lumbar radiculopathy    5. Other long term (current) drug therapy        Inspect reviewed, in order. UDS in order 12/4/20.  Cont Norco 10mg TID prn. Denies side effects, allows him to be functional.  RTC 3 months for f/u. Stable.

## 2022-03-11 ENCOUNTER — OFFICE VISIT (OUTPATIENT)
Dept: PAIN MEDICINE | Facility: CLINIC | Age: 57
End: 2022-03-11

## 2022-03-11 VITALS
DIASTOLIC BLOOD PRESSURE: 88 MMHG | WEIGHT: 220 LBS | SYSTOLIC BLOOD PRESSURE: 148 MMHG | OXYGEN SATURATION: 96 % | HEART RATE: 74 BPM | RESPIRATION RATE: 16 BRPM | BODY MASS INDEX: 31.5 KG/M2 | HEIGHT: 70 IN

## 2022-03-11 DIAGNOSIS — Z79.899 OTHER LONG TERM (CURRENT) DRUG THERAPY: ICD-10-CM

## 2022-03-11 DIAGNOSIS — G89.29 NECK PAIN, CHRONIC: ICD-10-CM

## 2022-03-11 DIAGNOSIS — M54.16 LUMBAR RADICULOPATHY: ICD-10-CM

## 2022-03-11 DIAGNOSIS — M54.12 CERVICAL RADICULOPATHY: ICD-10-CM

## 2022-03-11 DIAGNOSIS — G89.29 CHRONIC MIDLINE LOW BACK PAIN WITH LEFT-SIDED SCIATICA: Primary | ICD-10-CM

## 2022-03-11 DIAGNOSIS — M54.42 CHRONIC MIDLINE LOW BACK PAIN WITH LEFT-SIDED SCIATICA: Primary | ICD-10-CM

## 2022-03-11 DIAGNOSIS — M54.2 NECK PAIN, CHRONIC: ICD-10-CM

## 2022-03-11 PROCEDURE — 99213 OFFICE O/P EST LOW 20 MIN: CPT | Performed by: PHYSICAL MEDICINE & REHABILITATION

## 2022-03-11 RX ORDER — HYDROCODONE BITARTRATE AND ACETAMINOPHEN 10; 325 MG/1; MG/1
1 TABLET ORAL EVERY 8 HOURS PRN
Qty: 90 TABLET | Refills: 0 | Status: SHIPPED | OUTPATIENT
Start: 2022-03-11 | End: 2022-04-05 | Stop reason: SDUPTHER

## 2022-03-11 RX ORDER — HYDROCODONE BITARTRATE AND ACETAMINOPHEN 10; 325 MG/1; MG/1
1 TABLET ORAL EVERY 8 HOURS PRN
Qty: 90 TABLET | Refills: 0 | Status: SHIPPED | OUTPATIENT
Start: 2022-03-11 | End: 2022-06-03 | Stop reason: SDUPTHER

## 2022-03-11 NOTE — PROGRESS NOTES
Subjective   Jose Balderas is a 56 y.o. male.     Low back pain,  Lumbar Back Pain with LLE Numbness and LLE weakness.  Lumbar Back Pain is increased with prolonged ambulation.  Patient declines Epidural therapy.  Patient agreds to try ZT Lido pain patches for Pain, denied by insurance. Taking Norco 10mg TID prn with Dr. Ferguson with good relief without side effects. CT Lumbar with L5-S1 ant/post fusion, severe DJD at L4-L5, mild-mod L4-5 stenosis. MRI cervical with multilevel DDD and DJD worst at C6/7 with mild stenosis. Had fungal sinus infection, resolved with antifungals. Busy at work, current meds adequate.       The following portions of the patient's history were reviewed and updated as appropriate: allergies, current medications, past family history, past medical history, past social history, past surgical history and problem list.    Review of Systems   Constitutional: Negative for chills, fatigue and fever.   HENT: Negative for hearing loss and trouble swallowing.    Eyes: Negative for visual disturbance.   Respiratory: Negative for shortness of breath.    Cardiovascular: Negative for chest pain.   Gastrointestinal: Negative for abdominal pain, constipation, diarrhea, nausea and vomiting.   Genitourinary: Negative for urinary incontinence.   Musculoskeletal: Positive for back pain and neck pain. Negative for arthralgias, joint swelling and myalgias.   Neurological: Positive for numbness. Negative for dizziness, weakness and headache.       Objective   Physical Exam   Constitutional: He is oriented to person, place, and time. He appears well-developed and well-nourished.   HENT:   Head: Normocephalic and atraumatic.   Eyes: Pupils are equal, round, and reactive to light.   Cardiovascular: Normal rate, regular rhythm and normal heart sounds.   Pulmonary/Chest: Breath sounds normal.   Abdominal: Soft. Bowel sounds are normal. He exhibits no distension. There is no abdominal tenderness.   Neurological: He is  alert and oriented to person, place, and time. He has normal reflexes. He displays normal reflexes. No sensory deficit.   Psychiatric: His behavior is normal. Thought content normal.         Assessment/Plan   Diagnoses and all orders for this visit:    1. Chronic midline low back pain with left-sided sciatica (Primary)    2. Neck pain, chronic    3. Cervical radiculopathy    4. Lumbar radiculopathy    5. Other long term (current) drug therapy        Inspect reviewed, in order. UDS in order 12/3/21.  Cont Norco 10mg TID prn. Denies side effects, allows him to be functional.  RTC 3 months for f/u. Stable.

## 2022-04-05 ENCOUNTER — TELEPHONE (OUTPATIENT)
Dept: PAIN MEDICINE | Facility: CLINIC | Age: 57
End: 2022-04-05

## 2022-04-05 DIAGNOSIS — M54.42 CHRONIC MIDLINE LOW BACK PAIN WITH LEFT-SIDED SCIATICA: ICD-10-CM

## 2022-04-05 DIAGNOSIS — G89.29 CHRONIC MIDLINE LOW BACK PAIN WITH LEFT-SIDED SCIATICA: ICD-10-CM

## 2022-04-05 RX ORDER — HYDROCODONE BITARTRATE AND ACETAMINOPHEN 10; 325 MG/1; MG/1
1 TABLET ORAL EVERY 8 HOURS PRN
Qty: 90 TABLET | Refills: 0 | Status: SHIPPED | OUTPATIENT
Start: 2022-04-05 | End: 2022-06-03 | Stop reason: SDUPTHER

## 2022-04-05 NOTE — TELEPHONE ENCOUNTER
Caller: GODFREY HARMON    Relationship: SELF    Best call back number:     Pharmacy where request should be sent:  [place] [phone]    Additional details provided by patient: Jose Juan Harmon Rep   04/05/22 12:34 EDT

## 2022-06-03 ENCOUNTER — OFFICE VISIT (OUTPATIENT)
Dept: PAIN MEDICINE | Facility: CLINIC | Age: 57
End: 2022-06-03

## 2022-06-03 VITALS
SYSTOLIC BLOOD PRESSURE: 142 MMHG | OXYGEN SATURATION: 95 % | RESPIRATION RATE: 16 BRPM | HEIGHT: 70 IN | BODY MASS INDEX: 31.5 KG/M2 | DIASTOLIC BLOOD PRESSURE: 79 MMHG | HEART RATE: 85 BPM | WEIGHT: 220 LBS

## 2022-06-03 DIAGNOSIS — G89.29 NECK PAIN, CHRONIC: ICD-10-CM

## 2022-06-03 DIAGNOSIS — M54.2 NECK PAIN, CHRONIC: ICD-10-CM

## 2022-06-03 DIAGNOSIS — G89.29 CHRONIC MIDLINE LOW BACK PAIN WITH LEFT-SIDED SCIATICA: Primary | ICD-10-CM

## 2022-06-03 DIAGNOSIS — Z79.899 OTHER LONG TERM (CURRENT) DRUG THERAPY: ICD-10-CM

## 2022-06-03 DIAGNOSIS — M54.42 CHRONIC MIDLINE LOW BACK PAIN WITH LEFT-SIDED SCIATICA: Primary | ICD-10-CM

## 2022-06-03 DIAGNOSIS — M54.12 CERVICAL RADICULOPATHY: ICD-10-CM

## 2022-06-03 DIAGNOSIS — M54.16 LUMBAR RADICULOPATHY: ICD-10-CM

## 2022-06-03 PROCEDURE — 99213 OFFICE O/P EST LOW 20 MIN: CPT | Performed by: PHYSICAL MEDICINE & REHABILITATION

## 2022-06-03 RX ORDER — HYDROCODONE BITARTRATE AND ACETAMINOPHEN 10; 325 MG/1; MG/1
1 TABLET ORAL EVERY 8 HOURS PRN
Qty: 90 TABLET | Refills: 0 | Status: SHIPPED | OUTPATIENT
Start: 2022-06-03 | End: 2022-08-26 | Stop reason: SDUPTHER

## 2022-06-03 NOTE — PROGRESS NOTES
Subjective   Jose Balderas is a 56 y.o. male.     Low back pain,  Lumbar Back Pain with LLE Numbness and LLE weakness.  Lumbar Back Pain is increased with prolonged ambulation.  Patient declines Epidural therapy.  Patient agreds to try ZT Lido pain patches for Pain, denied by insurance. Taking Norco 10mg TID prn with Dr. Ferguson with good relief without side effects. CT Lumbar with L5-S1 ant/post fusion, severe DJD at L4-L5, mild-mod L4-5 stenosis. MRI cervical with multilevel DDD and DJD worst at C6/7 with mild stenosis. Had fungal sinus infection, resolved with antifungals. Busy at work, current meds adequate.       The following portions of the patient's history were reviewed and updated as appropriate: allergies, current medications, past family history, past medical history, past social history, past surgical history and problem list.    Review of Systems   Constitutional: Negative for chills, fatigue and fever.   HENT: Negative for hearing loss and trouble swallowing.    Eyes: Negative for visual disturbance.   Respiratory: Negative for shortness of breath.    Cardiovascular: Negative for chest pain.   Gastrointestinal: Negative for abdominal pain, constipation, diarrhea, nausea and vomiting.   Genitourinary: Negative for urinary incontinence.   Musculoskeletal: Positive for back pain and neck pain. Negative for arthralgias, joint swelling and myalgias.   Neurological: Positive for numbness. Negative for dizziness, weakness and headache.       Objective   Physical Exam   Constitutional: He is oriented to person, place, and time. He appears well-developed and well-nourished.   HENT:   Head: Normocephalic and atraumatic.   Eyes: Pupils are equal, round, and reactive to light.   Cardiovascular: Normal rate, regular rhythm and normal heart sounds.   Pulmonary/Chest: Breath sounds normal.   Abdominal: Soft. Bowel sounds are normal. He exhibits no distension. There is no abdominal tenderness.   Neurological: He is  alert and oriented to person, place, and time. He has normal reflexes. He displays normal reflexes. No sensory deficit.   Psychiatric: His behavior is normal. Thought content normal.         Assessment & Plan   Diagnoses and all orders for this visit:    1. Chronic midline low back pain with left-sided sciatica (Primary)    2. Neck pain, chronic    3. Cervical radiculopathy    4. Lumbar radiculopathy    5. Other long term (current) drug therapy        Inspect reviewed, in order. UDS in order 12/3/21.  Cont Norco 10mg TID prn. Denies side effects, allows him to be functional.  RTC 3 months for f/u. Stable.

## 2022-08-26 ENCOUNTER — OFFICE VISIT (OUTPATIENT)
Dept: PAIN MEDICINE | Facility: CLINIC | Age: 57
End: 2022-08-26

## 2022-08-26 VITALS
RESPIRATION RATE: 16 BRPM | DIASTOLIC BLOOD PRESSURE: 78 MMHG | OXYGEN SATURATION: 96 % | HEART RATE: 69 BPM | SYSTOLIC BLOOD PRESSURE: 151 MMHG

## 2022-08-26 DIAGNOSIS — M54.2 NECK PAIN, CHRONIC: ICD-10-CM

## 2022-08-26 DIAGNOSIS — G89.29 NECK PAIN, CHRONIC: ICD-10-CM

## 2022-08-26 DIAGNOSIS — G89.29 CHRONIC MIDLINE LOW BACK PAIN WITH LEFT-SIDED SCIATICA: Primary | ICD-10-CM

## 2022-08-26 DIAGNOSIS — M54.16 LUMBAR RADICULOPATHY: ICD-10-CM

## 2022-08-26 DIAGNOSIS — Z79.899 OTHER LONG TERM (CURRENT) DRUG THERAPY: ICD-10-CM

## 2022-08-26 DIAGNOSIS — M54.42 CHRONIC MIDLINE LOW BACK PAIN WITH LEFT-SIDED SCIATICA: Primary | ICD-10-CM

## 2022-08-26 DIAGNOSIS — M54.12 CERVICAL RADICULOPATHY: ICD-10-CM

## 2022-08-26 PROCEDURE — 99213 OFFICE O/P EST LOW 20 MIN: CPT | Performed by: PHYSICAL MEDICINE & REHABILITATION

## 2022-08-26 RX ORDER — HYDROCODONE BITARTRATE AND ACETAMINOPHEN 10; 325 MG/1; MG/1
1 TABLET ORAL EVERY 8 HOURS PRN
Qty: 90 TABLET | Refills: 0 | Status: SHIPPED | OUTPATIENT
Start: 2022-08-26 | End: 2022-12-02 | Stop reason: SDUPTHER

## 2022-10-25 NOTE — PROGRESS NOTES
Physician Progress Note      PATIENT:               Afua Ohara  CSN #:                  [id number]  :                       1983  ADMIT DATE:       10/21/2022 11:55 AM  DISCH DATE:  RESPONDING  PROVIDER #:        Anuradha Merlos MD          QUERY TEXT:    Pt admitted with Acute on Chronic Systolic CHF. Pt noted to also have ICM, HTN, CAD. If possible, please document in progress notes and discharge summary the etiology of CHF, if able to be determined. The medical record reflects the following:  Risk Factors: CAD, HTN, ICM  Clinical Indicators: admitted with increased SOB, Orthopnea, mild cough. BNP 3265. CXR showing Cardio mediastinal silhouette stable and enlarged, cardiomegaly. A/C Systolic CHF is documented. Treatment: Lasix IV, Coreg, CARLOS ENRIQUE diet, strict I/O daily weight, cardiology consult, RHC planned once stable    Thank you,  Azalea Lacey RN  Clinical Documentation  [phone], or via [employer]  Options provided:  -- CHF due to Hypertensive Heart Disease  -- CHF due to Hypertensive Heart Disease and CAD  -- CHF not due to Hypertension but due to CAD  -- CHF due to Hypertensive Heart Disease and ICMP  -- CHF not due to Hypertension but due to ICMP  -- CHF due to Hypertensive Heart Disease and Valvular Heart Disease  -- CHF not due to Hypertension but due to valvular heart disease  -- CHF due to HTN, CAD, ICMP and valvular disease  -- Other - I will add my own diagnosis  -- Disagree - Not applicable / Not valid  -- Disagree - Clinically unable to determine / Unknown  -- Refer to Clinical Documentation Reviewer    PROVIDER RESPONSE TEXT:    This patient has CHF due to hypertensive heart disease and CAD.     Query created by: Jimy Miller on 10/24/2022 12:07 PM      Electronically signed by:  Anuradha Merlos MD 10/25/2022 10:23 AM Subjective   Jose Balderas is a 54 y.o. male.     Low back pain,  Lumbar Back Pain with LLE Numbness and LLE weakness.  Lumbar Back Pain is increased with prolonged ambulation.  Patient declines Epidural therapy.  Patient agreds to try ZT Lido pain patches for Pain, denied by insurance. Taking Norco 10mg TID prn with Dr. Ferguson with good relief without side effects. CT Lumbar with L5-S1 ant/post fusion, severe DJD at L4-L5, mild-mod L4-5 stenosis. MRI cervical with multilevel DDD and DJD worst at C6/7 with mild stenosis. Had fungal sinus infection, resolved with antifungals.       The following portions of the patient's history were reviewed and updated as appropriate: allergies, current medications, past family history, past medical history, past social history, past surgical history and problem list.    Review of Systems   Constitutional: Negative for chills, fatigue and fever.   HENT: Negative for hearing loss and trouble swallowing.    Eyes: Negative for visual disturbance.   Respiratory: Negative for shortness of breath.    Cardiovascular: Negative for chest pain.   Gastrointestinal: Negative for abdominal pain, constipation, diarrhea, nausea and vomiting.   Genitourinary: Negative for urinary incontinence.   Musculoskeletal: Positive for back pain and neck pain. Negative for arthralgias, joint swelling and myalgias.   Neurological: Positive for numbness. Negative for dizziness, weakness and headache.       Objective   Physical Exam   Constitutional: He is oriented to person, place, and time. He appears well-developed and well-nourished.   HENT:   Head: Normocephalic and atraumatic.   Eyes: Pupils are equal, round, and reactive to light. EOM are normal.   Cardiovascular: Normal rate, regular rhythm, normal heart sounds and intact distal pulses.   Pulmonary/Chest: Breath sounds normal.   Abdominal: Soft. Bowel sounds are normal. He exhibits no distension. There is no tenderness.   Neurological: He is alert and  oriented to person, place, and time. He has normal strength and normal reflexes. He displays normal reflexes. No sensory deficit.   Psychiatric: He has a normal mood and affect. His behavior is normal. Thought content normal.         Assessment/Plan   Jose was seen today for back pain.    Diagnoses and all orders for this visit:    Cervical radiculopathy    Chronic midline low back pain with left-sided sciatica    Lumbar radiculopathy    Neck pain, chronic    Other long term (current) drug therapy        Inspect reviewed, in order. UDS in order 2/27/20.  Cont Norco 10mg TID prn.  RTC 3 months for f/u. Stable.

## 2022-12-02 ENCOUNTER — OFFICE VISIT (OUTPATIENT)
Dept: PAIN MEDICINE | Facility: CLINIC | Age: 57
End: 2022-12-02

## 2022-12-02 DIAGNOSIS — G89.29 NECK PAIN, CHRONIC: ICD-10-CM

## 2022-12-02 DIAGNOSIS — G89.29 CHRONIC MIDLINE LOW BACK PAIN WITH LEFT-SIDED SCIATICA: Primary | ICD-10-CM

## 2022-12-02 DIAGNOSIS — M54.42 CHRONIC MIDLINE LOW BACK PAIN WITH LEFT-SIDED SCIATICA: Primary | ICD-10-CM

## 2022-12-02 DIAGNOSIS — M54.12 CERVICAL RADICULOPATHY: ICD-10-CM

## 2022-12-02 DIAGNOSIS — M54.2 NECK PAIN, CHRONIC: ICD-10-CM

## 2022-12-02 DIAGNOSIS — M54.16 LUMBAR RADICULOPATHY: ICD-10-CM

## 2022-12-02 DIAGNOSIS — Z79.899 OTHER LONG TERM (CURRENT) DRUG THERAPY: ICD-10-CM

## 2022-12-02 DIAGNOSIS — Z79.899 HIGH RISK MEDICATION USE: Primary | ICD-10-CM

## 2022-12-02 PROCEDURE — 99213 OFFICE O/P EST LOW 20 MIN: CPT | Performed by: PHYSICAL MEDICINE & REHABILITATION

## 2022-12-02 RX ORDER — HYDROCODONE BITARTRATE AND ACETAMINOPHEN 10; 325 MG/1; MG/1
1 TABLET ORAL EVERY 8 HOURS PRN
Qty: 90 TABLET | Refills: 0 | Status: SHIPPED | OUTPATIENT
Start: 2022-12-02 | End: 2023-03-10 | Stop reason: SDUPTHER

## 2022-12-02 RX ORDER — METOPROLOL TARTRATE 100 MG/1
TABLET ORAL
COMMUNITY
Start: 2022-09-08

## 2022-12-02 RX ORDER — TEMAZEPAM 15 MG/1
1 CAPSULE ORAL
COMMUNITY
Start: 2022-11-10 | End: 2022-12-02 | Stop reason: ALTCHOICE

## 2022-12-02 NOTE — PROGRESS NOTES
Juan Alberto Balderas is a 57 y.o. male.     History of Present Illness  Low back pain,  Lumbar Back Pain with LLE Numbness and LLE weakness.  Lumbar Back Pain is increased with prolonged ambulation.  Patient declines Epidural therapy.  Patient agreds to try ZT Lido pain patches for Pain, denied by insurance. Taking Norco 10mg TID prn with Dr. Ferguson with good relief without side effects. CT Lumbar with L5-S1 ant/post fusion, severe DJD at L4-L5, mild-mod L4-5 stenosis. MRI cervical with multilevel DDD and DJD worst at C6/7 with mild stenosis. Had fungal sinus infection, resolved with antifungals. Busy at work, current meds adequate.       The following portions of the patient's history were reviewed and updated as appropriate: allergies, current medications, past family history, past medical history, past social history, past surgical history and problem list.    Review of Systems   Constitutional: Negative for chills, fatigue and fever.   HENT: Negative for hearing loss and trouble swallowing.    Eyes: Negative for visual disturbance.   Respiratory: Negative for shortness of breath.    Cardiovascular: Negative for chest pain.   Gastrointestinal: Negative for abdominal pain, constipation, diarrhea, nausea and vomiting.   Genitourinary: Negative for urinary incontinence.   Musculoskeletal: Positive for back pain and neck pain. Negative for arthralgias, joint swelling and myalgias.   Neurological: Positive for numbness. Negative for dizziness, weakness and headache.       Objective   Physical Exam   Constitutional: He is oriented to person, place, and time. He appears well-developed and well-nourished.   HENT:   Head: Normocephalic and atraumatic.   Eyes: Pupils are equal, round, and reactive to light.   Cardiovascular: Normal rate, regular rhythm and normal heart sounds.   Pulmonary/Chest: Breath sounds normal.   Abdominal: Soft. Bowel sounds are normal. He exhibits no distension. There is no abdominal  tenderness.   Neurological: He is alert and oriented to person, place, and time. He has normal reflexes. He displays normal reflexes. No sensory deficit.   Psychiatric: His behavior is normal. Thought content normal.         Assessment & Plan   Diagnoses and all orders for this visit:    1. Chronic midline low back pain with left-sided sciatica (Primary)    2. Cervical radiculopathy    3. Lumbar radiculopathy    4. Neck pain, chronic    5. Other long term (current) drug therapy        Inspect reviewed, in order. UDS in order 12/3/21.  Cont Norco 10mg TID prn. Denies side effects, allows him to be functional.  RTC 3 months for f/u. Stable.                   Physical Exam  Constitutional:       Appearance: He is well-developed and well-nourished.   HENT:      Head: Normocephalic and atraumatic.   Eyes:      Pupils: Pupils are equal, round, and reactive to light.   Cardiovascular:      Rate and Rhythm: Normal rate and regular rhythm.      Heart sounds: Normal heart sounds.   Pulmonary:      Breath sounds: Normal breath sounds.   Abdominal:      General: Bowel sounds are normal. There is no distension.      Palpations: Abdomen is soft.      Tenderness: There is no abdominal tenderness.   Neurological:      Mental Status: He is alert and oriented to person, place, and time.      Sensory: No sensory deficit.      Deep Tendon Reflexes: Reflexes are normal and symmetric. Reflexes normal.   Psychiatric:         Behavior: Behavior normal.         Thought Content: Thought content normal.

## 2023-03-09 ENCOUNTER — TELEPHONE (OUTPATIENT)
Dept: PAIN MEDICINE | Facility: CLINIC | Age: 58
End: 2023-03-09

## 2023-03-09 NOTE — TELEPHONE ENCOUNTER
Caller: Jose Balderas    Relationship to patient: Self    Best call back number:     Chief complaint: BACK PAIN     Type of visit: FUP     Additional notes:PATIENT IS NOT FEELING WELL AND REQUESTING TO DO A TELEHEALTH APPT INSTEAD.  PLEASE CONTACT PATIENT

## 2023-03-10 ENCOUNTER — OFFICE VISIT (OUTPATIENT)
Dept: PAIN MEDICINE | Facility: CLINIC | Age: 58
End: 2023-03-10
Payer: COMMERCIAL

## 2023-03-10 DIAGNOSIS — M54.16 LUMBAR RADICULOPATHY: ICD-10-CM

## 2023-03-10 DIAGNOSIS — M54.42 CHRONIC MIDLINE LOW BACK PAIN WITH LEFT-SIDED SCIATICA: Primary | ICD-10-CM

## 2023-03-10 DIAGNOSIS — M54.12 CERVICAL RADICULOPATHY: ICD-10-CM

## 2023-03-10 DIAGNOSIS — Z79.899 OTHER LONG TERM (CURRENT) DRUG THERAPY: ICD-10-CM

## 2023-03-10 DIAGNOSIS — G89.29 CHRONIC MIDLINE LOW BACK PAIN WITH LEFT-SIDED SCIATICA: Primary | ICD-10-CM

## 2023-03-10 DIAGNOSIS — M54.2 NECK PAIN, CHRONIC: ICD-10-CM

## 2023-03-10 DIAGNOSIS — G89.29 NECK PAIN, CHRONIC: ICD-10-CM

## 2023-03-10 PROCEDURE — 99441 PR PHYS/QHP TELEPHONE EVALUATION 5-10 MIN: CPT | Performed by: PHYSICAL MEDICINE & REHABILITATION

## 2023-03-10 RX ORDER — HYDROCODONE BITARTRATE AND ACETAMINOPHEN 10; 325 MG/1; MG/1
1 TABLET ORAL EVERY 8 HOURS PRN
Qty: 90 TABLET | Refills: 0 | Status: SHIPPED | OUTPATIENT
Start: 2023-03-10

## 2023-03-10 NOTE — PROGRESS NOTES
Juan Alberto Balderas is a 57 y.o. male.     History of Present Illness  Low back pain,  Lumbar Back Pain with LLE Numbness and LLE weakness.  Lumbar Back Pain is increased with prolonged ambulation.  Patient declines Epidural therapy.  Patient agreds to try ZT Lido pain patches for Pain, denied by insurance. Taking Norco 10mg TID prn with Dr. Ferguson with good relief without side effects. CT Lumbar with L5-S1 ant/post fusion, severe DJD at L4-L5, mild-mod L4-5 stenosis. MRI cervical with multilevel DDD and DJD worst at C6/7 with mild stenosis. Had fungal sinus infection, resolved with antifungals. Busy at work, current meds adequate.       The following portions of the patient's history were reviewed and updated as appropriate: allergies, current medications, past family history, past medical history, past social history, past surgical history and problem list.    Review of Systems   Constitutional: Negative for chills, fatigue and fever.   HENT: Negative for hearing loss and trouble swallowing.    Eyes: Negative for visual disturbance.   Respiratory: Negative for shortness of breath.    Cardiovascular: Negative for chest pain.   Gastrointestinal: Negative for abdominal pain, constipation, diarrhea, nausea and vomiting.   Genitourinary: Negative for urinary incontinence.   Musculoskeletal: Positive for back pain and neck pain. Negative for arthralgias, joint swelling and myalgias.   Neurological: Positive for numbness. Negative for dizziness, weakness and headache.       Objective   Physical Exam   Constitutional: He is oriented to person, place, and time. He appears well-developed and well-nourished.   Neurological: He is alert and oriented to person, place, and time. He has normal reflexes.   Psychiatric: His behavior is normal. Mood, judgment and thought content normal.         Assessment & Plan   Diagnoses and all orders for this visit:    1. Chronic midline low back pain with left-sided sciatica  (Primary)    2. Lumbar radiculopathy    3. Neck pain, chronic    4. Other long term (current) drug therapy    5. Cervical radiculopathy        Inspect reviewed, in order. UDS in order 12/2/22.  Cont Norco 10mg TID prn. Denies side effects, allows him to be functional.  RTC 3 months for f/u. Stable.   Telephone visit, spent 6 minutes discussing his symptoms, plan of care, medications.

## 2023-06-02 ENCOUNTER — OFFICE VISIT (OUTPATIENT)
Dept: PAIN MEDICINE | Facility: CLINIC | Age: 58
End: 2023-06-02

## 2023-06-02 VITALS
HEART RATE: 77 BPM | SYSTOLIC BLOOD PRESSURE: 132 MMHG | DIASTOLIC BLOOD PRESSURE: 89 MMHG | OXYGEN SATURATION: 95 % | RESPIRATION RATE: 16 BRPM

## 2023-06-02 DIAGNOSIS — M54.42 CHRONIC MIDLINE LOW BACK PAIN WITH LEFT-SIDED SCIATICA: Primary | ICD-10-CM

## 2023-06-02 DIAGNOSIS — Z79.899 OTHER LONG TERM (CURRENT) DRUG THERAPY: ICD-10-CM

## 2023-06-02 DIAGNOSIS — M54.12 CERVICAL RADICULOPATHY: ICD-10-CM

## 2023-06-02 DIAGNOSIS — G89.29 NECK PAIN, CHRONIC: ICD-10-CM

## 2023-06-02 DIAGNOSIS — M54.16 LUMBAR RADICULOPATHY: ICD-10-CM

## 2023-06-02 DIAGNOSIS — G89.29 CHRONIC MIDLINE LOW BACK PAIN WITH LEFT-SIDED SCIATICA: Primary | ICD-10-CM

## 2023-06-02 DIAGNOSIS — M54.2 NECK PAIN, CHRONIC: ICD-10-CM

## 2023-06-02 RX ORDER — HYDROCODONE BITARTRATE AND ACETAMINOPHEN 10; 325 MG/1; MG/1
1 TABLET ORAL EVERY 8 HOURS PRN
Qty: 90 TABLET | Refills: 0 | Status: SHIPPED | OUTPATIENT
Start: 2023-06-02

## 2023-06-02 NOTE — PROGRESS NOTES
Juan Alberto Balderas is a 57 y.o. male.     History of Present Illness  Low back pain,  Lumbar Back Pain with LLE Numbness and LLE weakness.  Lumbar Back Pain is increased with prolonged ambulation.  Patient declines Epidural therapy.  Patient agreds to try ZT Lido pain patches for Pain, denied by insurance. Taking Norco 10mg TID prn with Dr. Ferguson with good relief without side effects. CT Lumbar with L5-S1 ant/post fusion, severe DJD at L4-L5, mild-mod L4-5 stenosis. MRI cervical with multilevel DDD and DJD worst at C6/7 with mild stenosis. Had fungal sinus infection, resolved with antifungals. Busy at work, current meds adequate. Hand pain worsening with use.       The following portions of the patient's history were reviewed and updated as appropriate: allergies, current medications, past family history, past medical history, past social history, past surgical history and problem list.    Review of Systems   Constitutional: Negative for chills, fatigue and fever.   HENT: Negative for hearing loss and trouble swallowing.    Eyes: Negative for visual disturbance.   Respiratory: Negative for shortness of breath.    Cardiovascular: Negative for chest pain.   Gastrointestinal: Negative for abdominal pain, constipation, diarrhea, nausea and vomiting.   Genitourinary: Negative for urinary incontinence.   Musculoskeletal: Positive for back pain and neck pain. Negative for arthralgias, joint swelling and myalgias.   Neurological: Positive for numbness. Negative for dizziness, weakness and headache.       Objective   Physical Exam   Constitutional: He is oriented to person, place, and time. He appears well-developed and well-nourished.   HENT:   Head: Normocephalic and atraumatic.   Eyes: Pupils are equal, round, and reactive to light.   Cardiovascular: Normal rate, regular rhythm and normal heart sounds.   Pulmonary/Chest: Breath sounds normal.   Abdominal: Soft. Bowel sounds are normal. He exhibits no distension.  There is no abdominal tenderness.   Neurological: He is alert and oriented to person, place, and time. He has normal reflexes. He displays normal reflexes. No sensory deficit.   Psychiatric: His behavior is normal. Thought content normal.         Assessment & Plan   Diagnoses and all orders for this visit:    1. Chronic midline low back pain with left-sided sciatica (Primary)    2. Neck pain, chronic    3. Cervical radiculopathy    4. Lumbar radiculopathy    5. Other long term (current) drug therapy        Inspect reviewed, in order. UDS in order 12/2/22.  Cont Norco 10mg TID prn. Denies side effects, allows him to be functional.  Begin Voltaren gel QID prn. Pt states that he is perfectly healthy and can use NSAIDs.  RTC 3 months for f/u. Stable.

## 2023-08-31 ENCOUNTER — TELEPHONE (OUTPATIENT)
Dept: PAIN MEDICINE | Facility: CLINIC | Age: 58
End: 2023-08-31
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
## 2023-09-01 ENCOUNTER — TELEMEDICINE (OUTPATIENT)
Dept: PAIN MEDICINE | Facility: CLINIC | Age: 58
End: 2023-09-01
Payer: COMMERCIAL

## 2023-09-01 DIAGNOSIS — M54.42 CHRONIC MIDLINE LOW BACK PAIN WITH LEFT-SIDED SCIATICA: Primary | ICD-10-CM

## 2023-09-01 DIAGNOSIS — M54.16 LUMBAR RADICULOPATHY: ICD-10-CM

## 2023-09-01 DIAGNOSIS — M54.12 CERVICAL RADICULOPATHY: ICD-10-CM

## 2023-09-01 DIAGNOSIS — G89.29 CHRONIC MIDLINE LOW BACK PAIN WITH LEFT-SIDED SCIATICA: Primary | ICD-10-CM

## 2023-09-01 DIAGNOSIS — G89.29 NECK PAIN, CHRONIC: ICD-10-CM

## 2023-09-01 DIAGNOSIS — M54.2 NECK PAIN, CHRONIC: ICD-10-CM

## 2023-09-01 DIAGNOSIS — Z79.899 OTHER LONG TERM (CURRENT) DRUG THERAPY: ICD-10-CM

## 2023-09-01 RX ORDER — HYDROCODONE BITARTRATE AND ACETAMINOPHEN 10; 325 MG/1; MG/1
1 TABLET ORAL EVERY 8 HOURS PRN
Qty: 90 TABLET | Refills: 0 | Status: SHIPPED | OUTPATIENT
Start: 2023-09-01
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
## 2023-09-08 ENCOUNTER — TELEPHONE (OUTPATIENT)
Dept: PAIN MEDICINE | Facility: CLINIC | Age: 58
End: 2023-09-08
Payer: COMMERCIAL

## 2023-09-08 DIAGNOSIS — M54.42 CHRONIC MIDLINE LOW BACK PAIN WITH LEFT-SIDED SCIATICA: ICD-10-CM

## 2023-09-08 DIAGNOSIS — G89.29 CHRONIC MIDLINE LOW BACK PAIN WITH LEFT-SIDED SCIATICA: ICD-10-CM

## 2023-09-08 NOTE — TELEPHONE ENCOUNTER
According to INSPECT, pt should have enough medication through 9/11/23 due to July's fill.    Pt confirming RX in stock (1st transfer)    INSPECT in chart

## 2023-09-08 NOTE — TELEPHONE ENCOUNTER
Caller: Jose Balderas    Relationship: Self    Best call back number:     Requested Prescriptions:   Requested Prescriptions     Pending Prescriptions Disp Refills    HYDROcodone-acetaminophen (Norco)  MG per tablet 90 tablet 0     Sig: Take 1 tablet by mouth Every 8 (Eight) Hours As Needed for Moderate Pain.        Pharmacy where request should be sent: [id number] - [place], [place] - [address] - [phone]  - [phone] FX     Last office visit with prescribing clinician: 6/2/2023   Last telemedicine visit with prescribing clinician: 9/1/2023   Next office visit with prescribing clinician: 12/1/2023     Additional details provided by patient: [facility] WAS OUT OF HIS MEDICATION    Does the patient have less than a 3 day supply:  [x] Yes  [] No    Would you like a call back once the refill request has been completed: [x] Yes [] No    If the office needs to give you a call back, can they leave a voicemail: [x] Yes [] No    Jose Juan Jean Rep   09/08/23 15:04 EDT

## 2023-09-11 RX ORDER — HYDROCODONE BITARTRATE AND ACETAMINOPHEN 10; 325 MG/1; MG/1
1 TABLET ORAL EVERY 8 HOURS PRN
Qty: 90 TABLET | Refills: 0 | Status: SHIPPED | OUTPATIENT
Start: 2023-09-11

## 2023-09-12 ENCOUNTER — TELEPHONE (OUTPATIENT)
Dept: PAIN MEDICINE | Facility: CLINIC | Age: 58
End: 2023-09-12

## 2023-09-12 NOTE — TELEPHONE ENCOUNTER
Caller: PATIENT    Relationship: SELF     Best call back number: [phone]    What form or medical record are you requesting: PATIENT IS REQUESTING A LETTER THAT PROVIDES DOCUMENTATION FROM DR. SANDS OF TREATMENT ADHERENCE AND EFFECTIVENESS OF SAFETY TO OPERATE A COMMERCIAL VEHICLE.    Who is requesting this form or medical record from you: PATIENT    How would you like to receive the form or medical records (pick-up, mail, fax):    If pick-up, provide patient with address and location details-  FROM THE OFFICE.     Timeframe paperwork needed: ASAP    Additional notes: PATIENT IS REQUESTING A LETTER THAT PROVIDES DOCUMENTATION FROM DR. SANDS OF TREATMENT ADHERENCE AND EFFECTIVENESS OF SAFETY TO OPERATE A COMMERCIAL VEHICLE. PATIENT STATES HE HAS FOUR DAYS TO PROVIDE THIS LETTER OR HE WILL LOOSE HIS LICENSE. IF YOU HAVE ANY FURTHER QUESTIONS REGARDING THIS LETTER YOU CAN GIVE THE PATIENT A CALL BACK. THANK YOU!

## 2023-09-14 NOTE — TELEPHONE ENCOUNTER
I really can't provide a letter that says he can safely operate a motor vehicle while using Hydrocodone. I can provide one that says he will be safe if he only uses it when not driving, if that is indeed the case.

## 2023-10-10 ENCOUNTER — TELEPHONE (OUTPATIENT)
Dept: PAIN MEDICINE | Facility: CLINIC | Age: 58
End: 2023-10-10

## 2023-10-10 DIAGNOSIS — M54.42 CHRONIC MIDLINE LOW BACK PAIN WITH LEFT-SIDED SCIATICA: ICD-10-CM

## 2023-10-10 DIAGNOSIS — G89.29 CHRONIC MIDLINE LOW BACK PAIN WITH LEFT-SIDED SCIATICA: ICD-10-CM

## 2023-10-10 NOTE — TELEPHONE ENCOUNTER
Caller: Jose Balderas A    Relationship to patient: Self    Best call back number: [phone]    Patient is needing: PATIENT HAS CONTACTED DIFFERENT PHARMACIES AND HE HAS NOT BEEN ABLE TO FIND ANY ONE THAT HAS HYDROCODONE 10-325MG IN STOCK AT THIS TIME. PATIENT ASKING IF DR. SANDS IS AWARE OF ANY OTHER PLACE WHERE MEDICATION IS AVAILABLE. PLEASE FOLLOW UP WITH PATIENT .

## 2023-10-10 NOTE — TELEPHONE ENCOUNTER
Caller: Jose Balderas A    Relationship to patient: Self    Best call back number: [phone]    Patient is needing: SPOKE WITH PHARMACY AND HE STATES THAT THEY WILL NOT GIVE HIM THE INFORMATION IF THEY HAVE HYDROCODONE 5 OR 7.5 THAT THE OFFICE WOULD HAVE TO CALL

## 2023-10-11 RX ORDER — HYDROCODONE BITARTRATE AND ACETAMINOPHEN 10; 325 MG/1; MG/1
1 TABLET ORAL EVERY 8 HOURS PRN
Qty: 90 TABLET | Refills: 0 | Status: SHIPPED | OUTPATIENT
Start: 2023-10-11

## 2023-10-11 NOTE — TELEPHONE ENCOUNTER
10/11/23-- spoke to pt-- informed him [facility] [place] has getting Hydrocodone acet -- he said  to send his rx of hydr  to [facility] in --

## 2023-10-11 NOTE — TELEPHONE ENCOUNTER
Hub staff attempted to follow warm transfer process and was unsuccessful     Caller: Jose Balderas A    Relationship to patient: Self    Best call back number: [phone]    Patient is needing: PATIENT CALLED BACK TO FOLLOW UP ON MEDICATION REFILL. HE IS OUT MEDICATION. PATIENT WOULD LIKE A CALL BACK. HE IS ASKING IF THE OFFICE CAN HELP HIM FIND A PHARMACY THAT WILL FILL THIS. HE STATES THAT THE PHARMACIES HE HAS CALLED WILL NOT GIVE HIM ANY INFORMATION SINCE HE IS NOT CURRENTLY HAVING ANYTHING FILLED THERE.

## 2023-11-10 ENCOUNTER — TELEPHONE (OUTPATIENT)
Dept: PAIN MEDICINE | Facility: CLINIC | Age: 58
End: 2023-11-10

## 2023-11-10 NOTE — TELEPHONE ENCOUNTER
Caller: Jose Balderas    Relationship: Self    Best call back number: [phone]    Requested Prescriptions:   HYDROcodone-acetaminophen (Norco)  MG per tablet [12300] (Order [id number])     Pharmacy where request should be sent:    [facility]    Last office visit with prescribing clinician: 6/2/2023   Last telemedicine visit with prescribing clinician: 9/1/2023   Next office visit with prescribing clinician: 12/1/2023     Additional details provided by patient:     Does the patient have less than a 3 day supply:  [x] Yes  [] No    Would you like a call back once the refill request has been completed: [] Yes [x] No    If the office needs to give you a call back, can they leave a voicemail: [] Yes [x] No    Jose Juan Cuba Rep   11/10/23 09:25 EST

## 2023-12-15 ENCOUNTER — OFFICE VISIT (OUTPATIENT)
Dept: PAIN MEDICINE | Facility: CLINIC | Age: 58
End: 2023-12-15
Payer: COMMERCIAL

## 2023-12-15 VITALS
DIASTOLIC BLOOD PRESSURE: 100 MMHG | BODY MASS INDEX: 31.91 KG/M2 | OXYGEN SATURATION: 96 % | HEART RATE: 78 BPM | SYSTOLIC BLOOD PRESSURE: 147 MMHG | WEIGHT: 222.4 LBS | RESPIRATION RATE: 16 BRPM

## 2023-12-15 DIAGNOSIS — M54.12 CERVICAL RADICULOPATHY: ICD-10-CM

## 2023-12-15 DIAGNOSIS — M54.42 CHRONIC MIDLINE LOW BACK PAIN WITH LEFT-SIDED SCIATICA: ICD-10-CM

## 2023-12-15 DIAGNOSIS — G89.29 NECK PAIN, CHRONIC: ICD-10-CM

## 2023-12-15 DIAGNOSIS — Z79.899 HIGH RISK MEDICATION USE: Primary | ICD-10-CM

## 2023-12-15 DIAGNOSIS — G89.29 CHRONIC MIDLINE LOW BACK PAIN WITH LEFT-SIDED SCIATICA: ICD-10-CM

## 2023-12-15 DIAGNOSIS — M54.16 LUMBAR RADICULOPATHY: ICD-10-CM

## 2023-12-15 DIAGNOSIS — Z79.899 OTHER LONG TERM (CURRENT) DRUG THERAPY: ICD-10-CM

## 2023-12-15 DIAGNOSIS — M54.2 NECK PAIN, CHRONIC: ICD-10-CM

## 2023-12-15 RX ORDER — HYDROCODONE BITARTRATE AND ACETAMINOPHEN 10; 325 MG/1; MG/1
1 TABLET ORAL EVERY 8 HOURS PRN
Qty: 90 TABLET | Refills: 0 | Status: SHIPPED | OUTPATIENT
Start: 2023-12-15
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
## 2024-02-23 ENCOUNTER — OFFICE VISIT (OUTPATIENT)
Dept: PAIN MEDICINE | Facility: CLINIC | Age: 59
End: 2024-02-23
Payer: COMMERCIAL

## 2024-02-23 VITALS
SYSTOLIC BLOOD PRESSURE: 143 MMHG | RESPIRATION RATE: 16 BRPM | HEART RATE: 72 BPM | DIASTOLIC BLOOD PRESSURE: 92 MMHG | WEIGHT: 219 LBS | BODY MASS INDEX: 31.42 KG/M2 | OXYGEN SATURATION: 95 %

## 2024-02-23 DIAGNOSIS — Z79.899 OTHER LONG TERM (CURRENT) DRUG THERAPY: ICD-10-CM

## 2024-02-23 DIAGNOSIS — G89.29 NECK PAIN, CHRONIC: ICD-10-CM

## 2024-02-23 DIAGNOSIS — M54.16 LUMBAR RADICULOPATHY: ICD-10-CM

## 2024-02-23 DIAGNOSIS — M54.42 CHRONIC MIDLINE LOW BACK PAIN WITH LEFT-SIDED SCIATICA: Primary | ICD-10-CM

## 2024-02-23 DIAGNOSIS — G89.29 CHRONIC MIDLINE LOW BACK PAIN WITH LEFT-SIDED SCIATICA: Primary | ICD-10-CM

## 2024-02-23 DIAGNOSIS — M54.12 CERVICAL RADICULOPATHY: ICD-10-CM

## 2024-02-23 DIAGNOSIS — M54.2 NECK PAIN, CHRONIC: ICD-10-CM

## 2024-02-23 RX ORDER — HYDROCODONE BITARTRATE AND ACETAMINOPHEN 10; 325 MG/1; MG/1
1 TABLET ORAL EVERY 8 HOURS PRN
Qty: 90 TABLET | Refills: 0 | Status: SHIPPED | OUTPATIENT
Start: 2024-02-23
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
## 2024-05-31 ENCOUNTER — OFFICE VISIT (OUTPATIENT)
Dept: PAIN MEDICINE | Facility: CLINIC | Age: 59
End: 2024-05-31
Payer: COMMERCIAL

## 2024-05-31 VITALS
SYSTOLIC BLOOD PRESSURE: 153 MMHG | RESPIRATION RATE: 16 BRPM | OXYGEN SATURATION: 96 % | DIASTOLIC BLOOD PRESSURE: 108 MMHG | HEART RATE: 77 BPM | BODY MASS INDEX: 31.42 KG/M2 | WEIGHT: 219 LBS

## 2024-05-31 DIAGNOSIS — M54.42 CHRONIC MIDLINE LOW BACK PAIN WITH LEFT-SIDED SCIATICA: Primary | ICD-10-CM

## 2024-05-31 DIAGNOSIS — M54.2 NECK PAIN, CHRONIC: ICD-10-CM

## 2024-05-31 DIAGNOSIS — G89.29 NECK PAIN, CHRONIC: ICD-10-CM

## 2024-05-31 DIAGNOSIS — M54.16 LUMBAR RADICULOPATHY: ICD-10-CM

## 2024-05-31 DIAGNOSIS — G89.29 CHRONIC MIDLINE LOW BACK PAIN WITH LEFT-SIDED SCIATICA: Primary | ICD-10-CM

## 2024-05-31 DIAGNOSIS — Z79.899 OTHER LONG TERM (CURRENT) DRUG THERAPY: ICD-10-CM

## 2024-05-31 DIAGNOSIS — M54.12 CERVICAL RADICULOPATHY: ICD-10-CM

## 2024-05-31 RX ORDER — HYDROCODONE BITARTRATE AND ACETAMINOPHEN 10; 325 MG/1; MG/1
1 TABLET ORAL EVERY 8 HOURS PRN
Qty: 90 TABLET | Refills: 0 | Status: SHIPPED | OUTPATIENT
Start: 2024-05-31
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
## 2024-06-21 ENCOUNTER — TRANSCRIBE ORDERS (OUTPATIENT)
Dept: ADMINISTRATIVE | Facility: HOSPITAL | Age: 59
End: 2024-06-21
Payer: COMMERCIAL

## 2024-06-21 DIAGNOSIS — I10 PRIMARY HYPERTENSION: Primary | ICD-10-CM

## 2024-09-06 ENCOUNTER — OFFICE VISIT (OUTPATIENT)
Dept: PAIN MEDICINE | Facility: CLINIC | Age: 59
End: 2024-09-06
Payer: COMMERCIAL

## 2024-09-06 VITALS
HEART RATE: 72 BPM | SYSTOLIC BLOOD PRESSURE: 131 MMHG | OXYGEN SATURATION: 97 % | BODY MASS INDEX: 29.95 KG/M2 | WEIGHT: 208.7 LBS | DIASTOLIC BLOOD PRESSURE: 84 MMHG | RESPIRATION RATE: 16 BRPM

## 2024-09-06 DIAGNOSIS — Z79.899 OTHER LONG TERM (CURRENT) DRUG THERAPY: ICD-10-CM

## 2024-09-06 DIAGNOSIS — M54.2 NECK PAIN, CHRONIC: ICD-10-CM

## 2024-09-06 DIAGNOSIS — M54.16 LUMBAR RADICULOPATHY: ICD-10-CM

## 2024-09-06 DIAGNOSIS — M54.12 CERVICAL RADICULOPATHY: ICD-10-CM

## 2024-09-06 DIAGNOSIS — G89.29 NECK PAIN, CHRONIC: ICD-10-CM

## 2024-09-06 DIAGNOSIS — M54.42 CHRONIC MIDLINE LOW BACK PAIN WITH LEFT-SIDED SCIATICA: Primary | ICD-10-CM

## 2024-09-06 DIAGNOSIS — G89.29 CHRONIC MIDLINE LOW BACK PAIN WITH LEFT-SIDED SCIATICA: Primary | ICD-10-CM

## 2024-09-06 RX ORDER — HYDROCODONE BITARTRATE AND ACETAMINOPHEN 10; 325 MG/1; MG/1
1 TABLET ORAL EVERY 8 HOURS PRN
Qty: 90 TABLET | Refills: 0 | Status: SHIPPED | OUTPATIENT
Start: 2024-09-06
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
## 2024-09-11 ENCOUNTER — TELEPHONE (OUTPATIENT)
Dept: PAIN MEDICINE | Facility: CLINIC | Age: 59
End: 2024-09-11
Payer: COMMERCIAL

## 2024-09-20 ENCOUNTER — TELEPHONE (OUTPATIENT)
Dept: PAIN MEDICINE | Facility: CLINIC | Age: 59
End: 2024-09-20
Payer: COMMERCIAL

## 2024-09-20 NOTE — TELEPHONE ENCOUNTER
Hub staff attempted to follow warm transfer process and was unsuccessful     Caller: Jose Balderas A    Relationship to patient: Self    Best call back number: [phone]    Patient is needing: PT STATES HE GOT A NOTIFICATION FROM [facility] ABOUT A MESSAGE THAT HAD BEEN SENT FROM THE OFFICE. PT UNABLE TO VIEW MESSAGE IN [facility], SINCE HE CANNOT ACCESS HIS ACCOUNT. PLEASE CONTACT PT TO DISCUSS. PT BELIEVES IT MAY BE IN REGARDS TO PAPERWORK/RECORDS REQUEST FROM THIS WEEK.

## 2024-09-23 ENCOUNTER — TELEPHONE (OUTPATIENT)
Dept: PAIN MEDICINE | Facility: CLINIC | Age: 59
End: 2024-09-23
Payer: COMMERCIAL

## 2024-10-11 ENCOUNTER — TELEPHONE (OUTPATIENT)
Dept: PAIN MEDICINE | Facility: CLINIC | Age: 59
End: 2024-10-11
Payer: COMMERCIAL

## 2024-10-11 DIAGNOSIS — M54.42 CHRONIC MIDLINE LOW BACK PAIN WITH LEFT-SIDED SCIATICA: ICD-10-CM

## 2024-10-11 DIAGNOSIS — G89.29 CHRONIC MIDLINE LOW BACK PAIN WITH LEFT-SIDED SCIATICA: ICD-10-CM

## 2024-10-11 RX ORDER — HYDROCODONE BITARTRATE AND ACETAMINOPHEN 10; 325 MG/1; MG/1
1 TABLET ORAL EVERY 8 HOURS PRN
Qty: 90 TABLET | Refills: 0 | Status: CANCELLED | OUTPATIENT
Start: 2024-10-11

## 2024-10-11 RX ORDER — HYDROCODONE BITARTRATE AND ACETAMINOPHEN 10; 325 MG/1; MG/1
1 TABLET ORAL EVERY 8 HOURS PRN
Qty: 90 TABLET | Refills: 0 | Status: SHIPPED | OUTPATIENT
Start: 2024-10-11

## 2024-10-11 NOTE — TELEPHONE ENCOUNTER
Caller: Jose Balderas    Relationship: Self    Best call back number: [phone]    Requested Prescriptions:   Requested Prescriptions     Pending Prescriptions Disp Refills    HYDROcodone-acetaminophen (Norco)  MG per tablet 90 tablet 0     Sig: Take 1 tablet by mouth Every 8 (Eight) Hours As Needed for Moderate Pain.        Pharmacy where request should be sent: [id number] [address] AT [address] [phone] [place] [phone]      Last office visit with prescribing clinician: 9/6/2024   Last telemedicine visit with prescribing clinician: Visit date not found   Next office visit with prescribing clinician: 12/13/2024     Additional details provided by patient: NEW INSURANCE, GOING THROUGH [facility] [place]    Does the patient have less than a 3 day supply:  [x] Yes  [] No    Would you like a call back once the refill request has been completed: [x] Yes [] No    If the office needs to give you a call back, can they leave a voicemail: [x] Yes [] No    Jose Juan Fernandes Rep   10/11/24 14:41 EDT

## 2024-10-31 ENCOUNTER — HOSPITAL ENCOUNTER (OUTPATIENT)
Dept: CT IMAGING | Facility: HOSPITAL | Age: 59
Discharge: HOME | End: 2024-10-31
Payer: MEDICAID

## 2024-10-31 ENCOUNTER — HOSPITAL ENCOUNTER (OUTPATIENT)
Dept: CARDIOLOGY | Facility: HOSPITAL | Age: 59
Discharge: HOME | End: 2024-10-31
Payer: MEDICAID

## 2024-10-31 VITALS
HEIGHT: 70 IN | WEIGHT: 208 LBS | SYSTOLIC BLOOD PRESSURE: 157 MMHG | BODY MASS INDEX: 29.78 KG/M2 | DIASTOLIC BLOOD PRESSURE: 85 MMHG

## 2024-10-31 DIAGNOSIS — I10 PRIMARY HYPERTENSION: ICD-10-CM

## 2024-10-31 PROCEDURE — 75571 CT HRT W/O DYE W/CA TEST: CPT

## 2024-10-31 PROCEDURE — 93356 MYOCRD STRAIN IMG SPCKL TRCK: CPT

## 2024-10-31 PROCEDURE — 93306 TTE W/DOPPLER COMPLETE: CPT

## 2024-11-04 LAB
BH CV ECHO LEFT VENTRICLE GLOBAL LONGITUDINAL STRAIN: -17.4 %
BH CV ECHO MEAS - AO MAX PG: 6.6 MMHG
BH CV ECHO MEAS - AO MEAN PG: 3 MMHG
BH CV ECHO MEAS - AO V2 MAX: 128 CM/SEC
BH CV ECHO MEAS - AO V2 VTI: 25.5 CM
BH CV ECHO MEAS - AVA(I,D): 3.1 CM2
BH CV ECHO MEAS - EDV(CUBED): 117.6 ML
BH CV ECHO MEAS - EDV(MOD-SP4): 107 ML
BH CV ECHO MEAS - EF(MOD-BP): 60 %
BH CV ECHO MEAS - EF(MOD-SP4): 59.7 %
BH CV ECHO MEAS - ESV(CUBED): 35.9 ML
BH CV ECHO MEAS - ESV(MOD-SP4): 43.1 ML
BH CV ECHO MEAS - FS: 32.7 %
BH CV ECHO MEAS - IVS/LVPW: 1 CM
BH CV ECHO MEAS - IVSD: 1 CM
BH CV ECHO MEAS - LA DIMENSION: 3.9 CM
BH CV ECHO MEAS - LAT PEAK E' VEL: 9.6 CM/SEC
BH CV ECHO MEAS - LV DIASTOLIC VOL/BSA (35-75): 50.4 CM2
BH CV ECHO MEAS - LV MASS(C)D: 176 GRAMS
BH CV ECHO MEAS - LV MAX PG: 3 MMHG
BH CV ECHO MEAS - LV MEAN PG: 2 MMHG
BH CV ECHO MEAS - LV SYSTOLIC VOL/BSA (12-30): 20.3 CM2
BH CV ECHO MEAS - LV V1 MAX: 87 CM/SEC
BH CV ECHO MEAS - LV V1 VTI: 17.6 CM
BH CV ECHO MEAS - LVIDD: 4.9 CM
BH CV ECHO MEAS - LVIDS: 3.3 CM
BH CV ECHO MEAS - LVOT AREA: 4.5 CM2
BH CV ECHO MEAS - LVOT DIAM: 2.4 CM
BH CV ECHO MEAS - LVPWD: 1 CM
BH CV ECHO MEAS - MED PEAK E' VEL: 6.5 CM/SEC
BH CV ECHO MEAS - MV A MAX VEL: 73.4 CM/SEC
BH CV ECHO MEAS - MV DEC SLOPE: 284 CM/SEC2
BH CV ECHO MEAS - MV DEC TIME: 0.25 SEC
BH CV ECHO MEAS - MV E MAX VEL: 50.8 CM/SEC
BH CV ECHO MEAS - MV E/A: 0.69
BH CV ECHO MEAS - MV MAX PG: 3.7 MMHG
BH CV ECHO MEAS - MV MEAN PG: 1 MMHG
BH CV ECHO MEAS - MV P1/2T: 85.3 MSEC
BH CV ECHO MEAS - MV V2 VTI: 25.4 CM
BH CV ECHO MEAS - MVA(P1/2T): 2.6 CM2
BH CV ECHO MEAS - MVA(VTI): 3.1 CM2
BH CV ECHO MEAS - PA ACC TIME: 0.11 SEC
BH CV ECHO MEAS - PA V2 MAX: 118 CM/SEC
BH CV ECHO MEAS - RAP SYSTOLE: 3 MMHG
BH CV ECHO MEAS - RV MAX PG: 2.16 MMHG
BH CV ECHO MEAS - RV V1 MAX: 73.4 CM/SEC
BH CV ECHO MEAS - RV V1 VTI: 16.3 CM
BH CV ECHO MEAS - RVDD: 3.7 CM
BH CV ECHO MEAS - SV(LVOT): 79.6 ML
BH CV ECHO MEAS - SV(MOD-SP4): 63.9 ML
BH CV ECHO MEAS - SVI(LVOT): 37.5 ML/M2
BH CV ECHO MEAS - SVI(MOD-SP4): 30.1 ML/M2
BH CV ECHO MEAS - TAPSE (>1.6): 2.47 CM
BH CV ECHO MEASUREMENTS AVERAGE E/E' RATIO: 6.31
BH CV XLRA - TDI S': 13.4 CM/SEC
LEFT ATRIUM VOLUME INDEX: 21.5 ML/M2
SINUS: 3.3 CM
STJ: 2.7 CM

## 2024-12-13 ENCOUNTER — OFFICE VISIT (OUTPATIENT)
Dept: PAIN MEDICINE | Facility: CLINIC | Age: 59
End: 2024-12-13
Payer: MEDICAID

## 2024-12-13 VITALS
BODY MASS INDEX: 31.01 KG/M2 | OXYGEN SATURATION: 95 % | DIASTOLIC BLOOD PRESSURE: 91 MMHG | WEIGHT: 216.1 LBS | HEART RATE: 95 BPM | RESPIRATION RATE: 16 BRPM | SYSTOLIC BLOOD PRESSURE: 139 MMHG

## 2024-12-13 DIAGNOSIS — M54.42 CHRONIC MIDLINE LOW BACK PAIN WITH LEFT-SIDED SCIATICA: Primary | ICD-10-CM

## 2024-12-13 DIAGNOSIS — G89.29 NECK PAIN, CHRONIC: ICD-10-CM

## 2024-12-13 DIAGNOSIS — M54.12 CERVICAL RADICULOPATHY: ICD-10-CM

## 2024-12-13 DIAGNOSIS — Z79.899 OTHER LONG TERM (CURRENT) DRUG THERAPY: ICD-10-CM

## 2024-12-13 DIAGNOSIS — M54.2 NECK PAIN, CHRONIC: ICD-10-CM

## 2024-12-13 DIAGNOSIS — Z79.899 HIGH RISK MEDICATION USE: Primary | ICD-10-CM

## 2024-12-13 DIAGNOSIS — M54.16 LUMBAR RADICULOPATHY: ICD-10-CM

## 2024-12-13 DIAGNOSIS — G89.29 CHRONIC MIDLINE LOW BACK PAIN WITH LEFT-SIDED SCIATICA: Primary | ICD-10-CM

## 2024-12-13 RX ORDER — HYDROCODONE BITARTRATE AND ACETAMINOPHEN 10; 325 MG/1; MG/1
1 TABLET ORAL EVERY 8 HOURS PRN
Qty: 90 TABLET | Refills: 0 | Status: SHIPPED | OUTPATIENT
Start: 2024-12-13
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
## 2025-03-07 ENCOUNTER — OFFICE VISIT (OUTPATIENT)
Dept: PAIN MEDICINE | Facility: CLINIC | Age: 60
End: 2025-03-07
Payer: MEDICAID

## 2025-03-07 VITALS
BODY MASS INDEX: 30.03 KG/M2 | HEART RATE: 69 BPM | SYSTOLIC BLOOD PRESSURE: 160 MMHG | RESPIRATION RATE: 16 BRPM | WEIGHT: 209.3 LBS | OXYGEN SATURATION: 97 % | DIASTOLIC BLOOD PRESSURE: 101 MMHG

## 2025-03-07 DIAGNOSIS — M54.2 NECK PAIN, CHRONIC: ICD-10-CM

## 2025-03-07 DIAGNOSIS — M54.16 LUMBAR RADICULOPATHY: ICD-10-CM

## 2025-03-07 DIAGNOSIS — M54.42 CHRONIC MIDLINE LOW BACK PAIN WITH LEFT-SIDED SCIATICA: Primary | ICD-10-CM

## 2025-03-07 DIAGNOSIS — M54.12 CERVICAL RADICULOPATHY: ICD-10-CM

## 2025-03-07 DIAGNOSIS — G89.29 CHRONIC MIDLINE LOW BACK PAIN WITH LEFT-SIDED SCIATICA: Primary | ICD-10-CM

## 2025-03-07 DIAGNOSIS — Z79.899 OTHER LONG TERM (CURRENT) DRUG THERAPY: ICD-10-CM

## 2025-03-07 DIAGNOSIS — G89.29 NECK PAIN, CHRONIC: ICD-10-CM

## 2025-03-07 RX ORDER — HYDROCODONE BITARTRATE AND ACETAMINOPHEN 10; 325 MG/1; MG/1
1 TABLET ORAL EVERY 8 HOURS PRN
Qty: 90 TABLET | Refills: 0 | Status: SHIPPED | OUTPATIENT
Start: 2025-03-07

## 2025-03-07 RX ORDER — CLOPIDOGREL BISULFATE 75 MG/1
1 TABLET ORAL DAILY
COMMUNITY
Start: 2024-12-05
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
## 2025-06-05 ENCOUNTER — OFFICE VISIT (OUTPATIENT)
Dept: PAIN MEDICINE | Facility: CLINIC | Age: 60
End: 2025-06-05
Payer: MEDICARE

## 2025-06-05 VITALS
HEART RATE: 75 BPM | SYSTOLIC BLOOD PRESSURE: 136 MMHG | RESPIRATION RATE: 16 BRPM | OXYGEN SATURATION: 98 % | WEIGHT: 201.1 LBS | DIASTOLIC BLOOD PRESSURE: 83 MMHG | BODY MASS INDEX: 28.85 KG/M2

## 2025-06-05 DIAGNOSIS — M54.16 LUMBAR RADICULOPATHY: ICD-10-CM

## 2025-06-05 DIAGNOSIS — M54.42 CHRONIC MIDLINE LOW BACK PAIN WITH LEFT-SIDED SCIATICA: Primary | ICD-10-CM

## 2025-06-05 DIAGNOSIS — G89.29 CHRONIC MIDLINE LOW BACK PAIN WITH LEFT-SIDED SCIATICA: Primary | ICD-10-CM

## 2025-06-05 DIAGNOSIS — Z79.899 OTHER LONG TERM (CURRENT) DRUG THERAPY: ICD-10-CM

## 2025-06-05 DIAGNOSIS — M54.2 NECK PAIN, CHRONIC: ICD-10-CM

## 2025-06-05 DIAGNOSIS — M54.12 CERVICAL RADICULOPATHY: ICD-10-CM

## 2025-06-05 DIAGNOSIS — G89.29 NECK PAIN, CHRONIC: ICD-10-CM

## 2025-06-05 RX ORDER — HYDROCODONE BITARTRATE AND ACETAMINOPHEN 10; 325 MG/1; MG/1
1 TABLET ORAL EVERY 8 HOURS PRN
Qty: 90 TABLET | Refills: 0 | Status: SHIPPED | OUTPATIENT
Start: 2025-06-05

## 2025-06-05 NOTE — PROGRESS NOTES
Juan Alberto Balderas is a 59 y.o. male.     History of Present Illness  CC: low back pain    Low back pain,  Lumbar Back Pain with LLE Numbness and LLE weakness, pain began > 10 years ago, treated by me since 9/9/19.  Lumbar Back Pain is increased with prolonged ambulation. Taking Norco 10mg TID prn with Dr. Ferguson with good relief without side effects, continued by me, still doing well. CT Lumbar with L5-S1 ant/post fusion, severe DJD at L4-L5, mild-mod L4-5 stenosis. MRI cervical with multilevel DDD and DJD worst at C6/7 with mild stenosis. Here for f/u.   Back Pain  Associated symptoms: numbness    Associated symptoms: no abdominal pain, no bladder incontinence, no chest pain, no fever and no weakness    Arthritis  He reports no joint swelling. Pertinent negatives include no diarrhea, fatigue or fever.        The following portions of the patient's history were reviewed and updated as appropriate: allergies, current medications, past family history, past medical history, past social history, past surgical history and problem list.    Review of Systems   Constitutional:  Negative for chills, fatigue and fever.   HENT:  Negative for hearing loss and trouble swallowing.    Eyes:  Negative for visual disturbance.   Respiratory:  Negative for shortness of breath.    Cardiovascular:  Negative for chest pain.   Gastrointestinal:  Negative for abdominal pain, constipation, diarrhea, nausea and vomiting.   Genitourinary:  Negative for urinary incontinence.   Musculoskeletal:  Positive for back pain and neck pain. Negative for arthralgias, joint swelling and myalgias.   Neurological:  Positive for numbness. Negative for dizziness, weakness and headache.       Objective   Physical Exam   Constitutional: He is oriented to person, place, and time. He appears well-developed and well-nourished.   HENT:   Head: Normocephalic and atraumatic.   Eyes: Pupils are equal, round, and reactive to light.   Cardiovascular: Normal  rate, regular rhythm and normal heart sounds.   Pulmonary/Chest: Breath sounds normal.   Abdominal: Soft. Bowel sounds are normal. He exhibits no distension. There is no abdominal tenderness.   Neurological: He is alert and oriented to person, place, and time. He has normal reflexes. He displays normal reflexes. No sensory deficit.   Psychiatric: His behavior is normal. Thought content normal.         Assessment & Plan   Diagnoses and all orders for this visit:    1. Chronic midline low back pain with left-sided sciatica (Primary)    2. Neck pain, chronic    3. Lumbar radiculopathy    4. Cervical radiculopathy    5. Other long term (current) drug therapy        Inspect reviewed, in order. UDS in order 12/13/24.  Cont Norco 10mg TID prn. Denies side effects, allows him to be functional. Filled 5/13/25.  Patient's symptoms are still adequately managed by current medication regimen, is doing well at this strength and dosage, therefore I will continue to prescribe unchanged as the most appropriate course of treatment.  Began Voltaren gel QID prn. Pt states that he is perfectly healthy and can use NSAIDs. Began RxAlt #2 cream to feet, may also try on hands.  N/V with gabapentinoids.   Referred to [facility] for b/l hand pain, he will have PCP try referring to someone else as he never heard from them.   RTC 3 months for f/u. .